# Patient Record
Sex: FEMALE | Race: WHITE | NOT HISPANIC OR LATINO | Employment: OTHER | URBAN - METROPOLITAN AREA
[De-identification: names, ages, dates, MRNs, and addresses within clinical notes are randomized per-mention and may not be internally consistent; named-entity substitution may affect disease eponyms.]

---

## 2017-04-12 ENCOUNTER — ALLSCRIPTS OFFICE VISIT (OUTPATIENT)
Dept: OTHER | Facility: OTHER | Age: 82
End: 2017-04-12

## 2017-05-02 ENCOUNTER — GENERIC CONVERSION - ENCOUNTER (OUTPATIENT)
Dept: OTHER | Facility: OTHER | Age: 82
End: 2017-05-02

## 2017-09-12 ENCOUNTER — GENERIC CONVERSION - ENCOUNTER (OUTPATIENT)
Dept: OTHER | Facility: OTHER | Age: 82
End: 2017-09-12

## 2017-10-23 ENCOUNTER — APPOINTMENT (OUTPATIENT)
Dept: LAB | Facility: CLINIC | Age: 82
End: 2017-10-23
Payer: MEDICARE

## 2017-10-23 ENCOUNTER — GENERIC CONVERSION - ENCOUNTER (OUTPATIENT)
Dept: FAMILY MEDICINE CLINIC | Facility: CLINIC | Age: 82
End: 2017-10-23

## 2017-10-23 ENCOUNTER — GENERIC CONVERSION - ENCOUNTER (OUTPATIENT)
Dept: OTHER | Facility: OTHER | Age: 82
End: 2017-10-23

## 2017-10-23 DIAGNOSIS — M81.0 AGE-RELATED OSTEOPOROSIS WITHOUT CURRENT PATHOLOGICAL FRACTURE: ICD-10-CM

## 2017-10-23 DIAGNOSIS — I10 ESSENTIAL (PRIMARY) HYPERTENSION: ICD-10-CM

## 2017-10-23 LAB
ALBUMIN SERPL BCP-MCNC: 4 G/DL (ref 3.5–5)
ALP SERPL-CCNC: 67 U/L (ref 46–116)
ALT SERPL W P-5'-P-CCNC: 18 U/L (ref 12–78)
ANION GAP SERPL CALCULATED.3IONS-SCNC: 6 MMOL/L (ref 4–13)
AST SERPL W P-5'-P-CCNC: 19 U/L (ref 5–45)
BASOPHILS # BLD AUTO: 0.02 THOUSANDS/ΜL (ref 0–0.1)
BASOPHILS NFR BLD AUTO: 0 % (ref 0–1)
BILIRUB SERPL-MCNC: 0.57 MG/DL (ref 0.2–1)
BUN SERPL-MCNC: 27 MG/DL (ref 5–25)
CALCIUM SERPL-MCNC: 9.8 MG/DL (ref 8.3–10.1)
CHLORIDE SERPL-SCNC: 104 MMOL/L (ref 100–108)
CO2 SERPL-SCNC: 31 MMOL/L (ref 21–32)
CREAT SERPL-MCNC: 1.25 MG/DL (ref 0.6–1.3)
EOSINOPHIL # BLD AUTO: 0.06 THOUSAND/ΜL (ref 0–0.61)
EOSINOPHIL NFR BLD AUTO: 1 % (ref 0–6)
ERYTHROCYTE [DISTWIDTH] IN BLOOD BY AUTOMATED COUNT: 13.7 % (ref 11.6–15.1)
GFR SERPL CREATININE-BSD FRML MDRD: 38 ML/MIN/1.73SQ M
GLUCOSE SERPL-MCNC: 106 MG/DL (ref 65–140)
HCT VFR BLD AUTO: 40.4 % (ref 34.8–46.1)
HGB BLD-MCNC: 13.2 G/DL (ref 11.5–15.4)
LYMPHOCYTES # BLD AUTO: 1.63 THOUSANDS/ΜL (ref 0.6–4.47)
LYMPHOCYTES NFR BLD AUTO: 17 % (ref 14–44)
MCH RBC QN AUTO: 31.6 PG (ref 26.8–34.3)
MCHC RBC AUTO-ENTMCNC: 32.7 G/DL (ref 31.4–37.4)
MCV RBC AUTO: 97 FL (ref 82–98)
MONOCYTES # BLD AUTO: 0.59 THOUSAND/ΜL (ref 0.17–1.22)
MONOCYTES NFR BLD AUTO: 6 % (ref 4–12)
NEUTROPHILS # BLD AUTO: 7.03 THOUSANDS/ΜL (ref 1.85–7.62)
NEUTS SEG NFR BLD AUTO: 76 % (ref 43–75)
NRBC BLD AUTO-RTO: 0 /100 WBCS
PLATELET # BLD AUTO: 232 THOUSANDS/UL (ref 149–390)
PMV BLD AUTO: 10.2 FL (ref 8.9–12.7)
POTASSIUM SERPL-SCNC: 4.4 MMOL/L (ref 3.5–5.3)
PROT SERPL-MCNC: 8.4 G/DL (ref 6.4–8.2)
RBC # BLD AUTO: 4.18 MILLION/UL (ref 3.81–5.12)
SODIUM SERPL-SCNC: 141 MMOL/L (ref 136–145)
TSH SERPL DL<=0.05 MIU/L-ACNC: 3.08 UIU/ML (ref 0.36–3.74)
WBC # BLD AUTO: 9.36 THOUSAND/UL (ref 4.31–10.16)

## 2017-10-23 PROCEDURE — 85025 COMPLETE CBC W/AUTO DIFF WBC: CPT

## 2017-10-23 PROCEDURE — 36415 COLL VENOUS BLD VENIPUNCTURE: CPT

## 2017-10-23 PROCEDURE — 80053 COMPREHEN METABOLIC PANEL: CPT

## 2017-10-23 PROCEDURE — 84443 ASSAY THYROID STIM HORMONE: CPT

## 2018-01-10 NOTE — RESULT NOTES
Verified Results  * XR CHEST PA & LATERAL [id number] 13:64XG Clrai Sit     Test Name Result Flag Reference   XR CHEST PA & LATERAL (Report)     CHEST      INDICATION: History of pneumonia  Weakness  , July 31, 2015   COMPARISON: October 6, 2015     VIEWS: Frontal and lateral projections; 2 images     FINDINGS:        The heart mediastinum are stable and within normal limits  Lungs are hyperinflated  There are chronic interstitial changes again seen within the lung bases  Superimposed on these chronic changes is a focal region of parenchymal opacity in the left lung base medially  This may have progressed when compared to    prior study and therefore CT scanning is recommended for further evaluation  Additional regions of parenchymal scarring are noted within the right midlung zone and within the lung apices bilaterally  These are unchanged  Compression fracture noted within the thoracic spine at the T8 level stable  IMPRESSION:     Focal parenchymal opacity within the left lung base for which CT scanning is recommended for further evaluation  This may have progressed when compared to prior study of October 6, 2015       ##sigslh##sigslh     ##[id number]##fuslh01       Workstation performed: [id number]     Signed by:   Kay Cagle MD   5/27/16       Discussion/Summary   please call  recent chest xray was reviewed by radiologist who suggested having a CAT scan for further evaluation  I will print out and she can schedule

## 2018-01-10 NOTE — MISCELLANEOUS
Chief Complaint  Chief Complaint Free Text Note Form: JESSIE: PT was admitted to [facility] from 06/20/2016 through 06/28/2016  Dx: Fall, chronic pain, bronchiectasis without complication, essential HTN, near syncope, CKD 3, hyponatremia, urinary retention, protein calorie malnutrition, mild  Spoke with Kesha Wilson (caretaker) who reports pt was d/c to [facility] for rehab    07/21/2016: Lazarus Raymond from [facility] called to schedule pt for RAMAKRISHNA ORTEGA appt  Pt is part of [employer] payment program  Pt is being d/c on Sat  7/23rd  Ofeers no complaints or concerns at this time  Pt is eager to go home  Pt is being d/c to [facility]  Follow up with pcp scheduled for 7/29th at 11:15am       History of Present Illness  TCM Communication [facility]: The patient is being contacted for follow-up after hospitalization and 06/29/2016  She was hospitalized at [facility]  The date of admission: 06/20/2016, date of discharge: 07/23/2016  Diagnosis: see note  She was discharged to an assisted living facility, From [facility] to [facility]  Medications were not reviewed today  She scheduled a follow up appointment  The patient is currently asymptomatic  Counseling was provided to patient's caretaker  Francy Carlson on 6/29/2016Seymour Butterfield from [facility] on 7/21/2016  Topics counseled included importance of compliance with treatment  Communication performed and completed by Shweta Leyva      Active Problems    1  Allergic rhinitis (477 9) (J30 9)   2  Anemia (285 9) (D64 9)   3  Back pain (724 5) (M54 9)   4  Closed fracture distal radius and ulna (813 44) (S52 609A,S52 509A)   5  Compression fracture (829 0) (T14 8)   6  Contusion, cheek (920) (S00 83XA)   7  Cystitis with hematuria (595 9) (N30 91)   8  Ecchymosis (459 89) (R58)   9  Gait disturbance (781 2) (R26 9)   10  Hypertension (401 9) (I10)   11  Laceration of forehead (873 42) (S01 81XA)   12   Left lower lobe pneumonia (486) (J18 9)   13  Nausea (787 02) (R11 0)   14  Need for pneumococcal vaccine (V03 82) (Z23)   15  Osteoporosis (733 00) (M81 0)   16  Syncope and collapse (780 2) (R55)    Past Medical History    1  History of fatigue (V13 89) (Z87 898)   2  History of hematuria (V13 09) (Z87 448)   3  History of osteopenia (V13 59) (Z87 39)    Surgical History    1  History of Hip Surgery    Social History    · Never A Smoker    Current Meds   1  Centrum Silver Adult 50+ Oral Tablet Recorded   2  Hydrochlorothiazide 12 5 MG Oral Tablet; take 1 tablet by mouth once daily; Therapy: 94HHQ7329 to (Evaluate:87Wbw2251)  Requested for: 37FYQ3888; Last   Rx:25Jan2016 Ordered   3  Lisinopril 5 MG Oral Tablet; Take 1 tablet daily; Therapy: 72QUG8386 to (Evaluate:97Tjh7668)  Requested for: 59EKU6446; Last   Rx:26Jan2016 Ordered   4  Ondansetron HCl - 4 MG Oral Tablet; TAKE 1 TABLET BY MOUTH EVERY 8 HOURS AS   NEEDED FOR NAUSUA AND VOMITING; Therapy: 21GJS6706 to (Felicia Harmon)  Requested for: 20Jun2016; Last   Rx:20Jun2016 Ordered   5  PreserVision AREDS CAPS; TAKE AS DIRECTED; Therapy: (Lauro Keenan) to Recorded   6  TraMADol HCl - 50 MG Oral Tablet; ONE PO Q 8 HRS  PRN PAIN;   Therapy: 20Jun2016 to (Last Rx:20Jun2016) Ordered   7  Vitamin D3 1000 UNIT Oral Tablet; TAKE AS DIRECTED; Therapy: (Recorded:03Bij5511) to Recorded    Allergies    1  Codeine Phosphate SOLN   2  Vicodin TABS    Health Management  Health Maintenance   Medicare Annual Wellness Visit; every 1 year; Next Due: 79IIE1472;  Active    Signatures   Electronically signed by : RON Smith ; Aug  1 9215  9:56AM EST                       (Author)

## 2018-01-13 VITALS
TEMPERATURE: 97.8 F | DIASTOLIC BLOOD PRESSURE: 76 MMHG | BODY MASS INDEX: 20.2 KG/M2 | WEIGHT: 114 LBS | RESPIRATION RATE: 14 BRPM | HEART RATE: 84 BPM | HEIGHT: 63 IN | SYSTOLIC BLOOD PRESSURE: 132 MMHG

## 2018-01-13 NOTE — RESULT NOTES
Verified Results  * CT CHEST WO CONTRAST [id number] 88:42ZO Jerry Villanueva Order Number: [id number]     Test Name Result Flag Reference   CT CHEST WO CONTRAST (Report)     CT CHEST WITHOUT IV CONTRAST     INDICATION: Pneumonia  Abnormal chest x-ray  Weight loss  COMPARISON: Multiple prior chest x-rays  TECHNIQUE: CT examination of the chest was performed without intravenous contrast  Axial, sagittal and coronal reformatted images were submitted for interpretation  Coronal thick section MIP (maximal intensity projection) images were also created  This examination, like all CT scans performed in the [facility], was performed utilizing techniques to minimize radiation dose exposure, including the use of iterative reconstruction and automated exposure control  FINDINGS:     LUNGS: There is moderate to severe cylindrical bronchiectasis, to the greatest degree in both lower lobes but also involving the remaining lobe centrally  In the lower lobes the bronchiectasis extends from central to peripheral lungs within the    dependent aspects of both lung bases as well as involving the base of the right middle lobe  There is moderate bronchial wall thickening in the most dilated segments in both lower lobes, left greater than right  Multiple dilated bronchi in the lung    bases are diffusely opacified, suggesting filled with mucoid debris  At both the base of the right middle lobe medial segment and the left lower lobe medial basilar there are areas of dense airspace consolidation, with a component of volume loss in the    right middle lobe but to a lesser degree in the left lower lobe  The left lower lobe segmental consolidation accounts for the recent chest x-ray finding  Although neoplastic etiology cannot be excluded, given the above overall findings seem more likely   this is acute on chronic infectious etiology, and/or component of postobstructive atelectasis   This doesn't appear to be present on chest x-ray from July 2015, not grossly changed since then  PLEURA: Unremarkable  HEART/GREAT VESSELS: Unremarkable for patient's age  MEDIASTINUM AND ADRIANA: Unremarkable  CHEST WALL AND LOWER NECK: Unremarkable  VISUALIZED STRUCTURES IN THE UPPER ABDOMEN: Unremarkable  OSSEOUS STRUCTURES: No acute fracture  No destructive osseous lesion  IMPRESSION:     Moderate to severe diffuse cylindrical bronchiectasis, greatest in the lower lung zones, seen in association with peribronchial thickening, left greater than right  Numerous subsegmental dilated bronchi are diffusely opacified with debris  Focal areas    of airspace consolidation in the left lower lobe and right middle lobe as detailed above  See comment  Workstation performed: [id number]     Signed by:   Gaurav De Souza MD   6/2/16       Discussion/Summary   please call caretaker  recent CT of chest showed chronic bronchial infection  She should have ov with [facility] for further eval and treat

## 2018-01-22 VITALS
WEIGHT: 115 LBS | RESPIRATION RATE: 16 BRPM | HEIGHT: 63 IN | SYSTOLIC BLOOD PRESSURE: 142 MMHG | BODY MASS INDEX: 20.38 KG/M2 | HEART RATE: 68 BPM | TEMPERATURE: 98 F | DIASTOLIC BLOOD PRESSURE: 80 MMHG

## 2018-01-22 VITALS — DIASTOLIC BLOOD PRESSURE: 80 MMHG | SYSTOLIC BLOOD PRESSURE: 140 MMHG

## 2018-04-02 DIAGNOSIS — I10 ESSENTIAL HYPERTENSION: Primary | ICD-10-CM

## 2018-04-02 RX ORDER — AMLODIPINE BESYLATE 2.5 MG/1
TABLET ORAL
Qty: 30 TABLET | Refills: 5 | Status: SHIPPED | OUTPATIENT
Start: 2018-04-02 | End: 2018-09-25 | Stop reason: SDUPTHER

## 2018-04-02 RX ORDER — METOPROLOL SUCCINATE 25 MG/1
TABLET, EXTENDED RELEASE ORAL
Qty: 30 TABLET | Refills: 5 | Status: SHIPPED | OUTPATIENT
Start: 2018-04-02 | End: 2018-09-25 | Stop reason: SDUPTHER

## 2018-04-23 ENCOUNTER — OFFICE VISIT (OUTPATIENT)
Dept: FAMILY MEDICINE CLINIC | Facility: CLINIC | Age: 83
End: 2018-04-23
Payer: MEDICARE

## 2018-04-23 VITALS
RESPIRATION RATE: 16 BRPM | BODY MASS INDEX: 21.53 KG/M2 | WEIGHT: 117 LBS | SYSTOLIC BLOOD PRESSURE: 122 MMHG | DIASTOLIC BLOOD PRESSURE: 70 MMHG | TEMPERATURE: 96.8 F | HEART RATE: 68 BPM | HEIGHT: 62 IN

## 2018-04-23 DIAGNOSIS — I10 ESSENTIAL HYPERTENSION: Primary | ICD-10-CM

## 2018-04-23 DIAGNOSIS — J30.89 NON-SEASONAL ALLERGIC RHINITIS, UNSPECIFIED TRIGGER: ICD-10-CM

## 2018-04-23 DIAGNOSIS — J30.9 ALLERGIC RHINITIS, UNSPECIFIED SEASONALITY, UNSPECIFIED TRIGGER: ICD-10-CM

## 2018-04-23 PROCEDURE — G0439 PPPS, SUBSEQ VISIT: HCPCS | Performed by: FAMILY MEDICINE

## 2018-04-23 PROCEDURE — 99213 OFFICE O/P EST LOW 20 MIN: CPT | Performed by: FAMILY MEDICINE

## 2018-04-23 RX ORDER — MONTELUKAST SODIUM 10 MG/1
10 TABLET ORAL
Qty: 30 TABLET | Refills: 5 | Status: SHIPPED | OUTPATIENT
Start: 2018-04-23 | End: 2018-10-22 | Stop reason: ALTCHOICE

## 2018-04-23 NOTE — PROGRESS NOTES
FAMILY PRACTICE OFFICE VISIT       NAME: Jean Marie Bryant  AGE: 80 y o  SEX: female       : 1925        MRN: [id number]    DATE: 2018  TIME: 12:26 PM    Assessment and Plan     Problem List Items Addressed This Visit     Essential hypertension - Primary (Chronic)     Hypertension  Patient blood pressure is stable at this time she will continue current regimen of medications  She will obtain blood work as ordered  Relevant Orders    CBC    Comprehensive metabolic panel    TSH, 3rd generation    Non-seasonal allergic rhinitis     Allergic rhinitis  Patient has tried over-the-counter Mucinex and Claritin without relief in symptoms  She was given a prescription to try singular 10 mg once daily and observe over the 1st month  Patient will call if symptoms persist           Other Visit Diagnoses     Allergic rhinitis, unspecified seasonality, unspecified trigger        Relevant Medications    montelukast (SINGULAIR) 10 mg tablet            There are no Patient Instructions on file for this visit  Chief Complaint     Chief Complaint   Patient presents with    Follow-up    Medicare Wellness Visit     subsequent       History of Present Illness     Patient is still living on her own has family members who watch over her and bring meals  She denies any recent illness nor did she complain of any pain anywhere  Patient complains of fatigue  She does not use assistive device for ambulating despite all encouragement as by myself or family members  Patient feels she does not needed despite having an obvious kyphotic gait  The patient's niece states that her is she has frequent rhinorrhea and her voice gets very raspy or goes out whenever she speaks often for an extended period of time  Patient denies any thirst sore throat        Review of Systems   Review of Systems   Constitutional: Positive for fatigue  Negative for fever  HENT:        As per HPI   Respiratory: Negative  Cardiovascular: Negative  Gastrointestinal: Negative  Genitourinary: Negative  Musculoskeletal: Negative  Active Problem List     Patient Active Problem List   Diagnosis    Chronic pain    Bronchiectasis without complication ([facility] [address] )    Essential hypertension    Fall    Near syncope    Chronic kidney disease, stage 3    Hyponatremia    Urinary retention    Protein-calorie malnutrition, mild (HCC)    Non-seasonal allergic rhinitis       Past Medical History:  Past Medical History:   Diagnosis Date    Compression fracture     Aug 2015    COPD (chronic obstructive pulmonary disease) (HCC)     Hematuria     Hypertension     Osteopenia     Renal disorder        Past Surgical History:  Past Surgical History:   Procedure Laterality Date    HIP SURGERY Left     fx       Family History:  No family history on file  Social History:  Social History     Social History    Marital status: Single     Spouse name: N/A    Number of children: N/A    Years of education: N/A     Occupational History    Not on file  Social History Main Topics    Smoking status: Never Smoker    Smokeless tobacco: Never Used    Alcohol use No    Drug use: No    Sexual activity: Not on file     Other Topics Concern    Not on file     Social History Narrative    No narrative on file       Objective     Vitals:    04/23/18 1133   BP: 122/70   Pulse: 68   Resp: 16   Temp: (!) 96 8 °F (36 °C)     Wt Readings from Last 3 Encounters:   04/23/18 53 1 kg (117 lb)   10/23/17 52 2 kg (115 lb)   04/12/17 51 7 kg (114 lb)       Physical Exam   Constitutional: She is oriented to person, place, and time  No distress  HENT:   Mouth/Throat: Oropharynx is clear and moist  No oropharyngeal exudate     Tympanic membranes within normal limits bilaterally   Neck:   No carotid bruits   Cardiovascular:   Regular rate and rhythm with no murmurs   Pulmonary/Chest:   Lungs are clear to auscultation without wheezes,rales, or rhonchi Musculoskeletal: She exhibits no edema  Patient ambulates without assistance but has a kyphotic gait with mild instability   Lymphadenopathy:     She has no cervical adenopathy  Neurological: She is alert and oriented to person, place, and time  No cranial nerve deficit         Pertinent Laboratory/Diagnostic Studies:  Lab Results   Component Value Date    GLUCOSE 106 10/23/2017    BUN 27 (H) 10/23/2017    CREATININE 1 25 10/23/2017    CALCIUM 9 8 10/23/2017     10/23/2017    K 4 4 10/23/2017    CO2 31 10/23/2017     10/23/2017     Lab Results   Component Value Date    ALT 18 10/23/2017    AST 19 10/23/2017    ALKPHOS 67 10/23/2017    BILITOT 0 57 10/23/2017       Lab Results   Component Value Date    WBC 9 36 10/23/2017    HGB 13 2 10/23/2017    HCT 40 4 10/23/2017    MCV 97 10/23/2017     10/23/2017       No results found for: TSH    No results found for: CHOL  No results found for: TRIG  No results found for: HDL  No results found for: LDLCALC  No results found for: HGBA1C    Results for orders placed or performed in visit on 10/23/17   CBC and differential   Result Value Ref Range    WBC 9 36 4 31 - 10 16 Thousand/uL    RBC 4 18 3 81 - 5 12 Million/uL    Hemoglobin 13 2 11 5 - 15 4 g/dL    Hematocrit 40 4 34 8 - 46 1 %    MCV 97 82 - 98 fL    MCH 31 6 26 8 - 34 3 pg    MCHC 32 7 31 4 - 37 4 g/dL    RDW 13 7 11 6 - 15 1 %    MPV 10 2 8 9 - 12 7 fL    Platelets 864 618 - 834 Thousands/uL    nRBC 0 /100 WBCs    Neutrophils Relative 76 (H) 43 - 75 %    Lymphocytes Relative 17 14 - 44 %    Monocytes Relative 6 4 - 12 %    Eosinophils Relative 1 0 - 6 %    Basophils Relative 0 0 - 1 %    Neutrophils Absolute 7 03 1 85 - 7 62 Thousands/µL    Lymphocytes Absolute 1 63 0 60 - 4 47 Thousands/µL    Monocytes Absolute 0 59 0 17 - 1 22 Thousand/µL    Eosinophils Absolute 0 06 0 00 - 0 61 Thousand/µL    Basophils Absolute 0 02 0 00 - 0 10 Thousands/µL   Comprehensive metabolic panel   Result Value Ref Range    Sodium 141 136 - 145 mmol/L    Potassium 4 4 3 5 - 5 3 mmol/L    Chloride 104 100 - 108 mmol/L    CO2 31 21 - 32 mmol/L    Anion Gap 6 4 - 13 mmol/L    BUN 27 (H) 5 - 25 mg/dL    Creatinine 1 25 0 60 - 1 30 mg/dL    Glucose 106 65 - 140 mg/dL    Calcium 9 8 8 3 - 10 1 mg/dL    AST 19 5 - 45 U/L    ALT 18 12 - 78 U/L    Alkaline Phosphatase 67 46 - 116 U/L    Total Protein 8 4 (H) 6 4 - 8 2 g/dL    Albumin 4 0 3 5 - 5 0 g/dL    Total Bilirubin 0 57 0 20 - 1 00 mg/dL    eGFR 38 ml/min/1 73sq m   TSH, 3rd generation   Result Value Ref Range    TSH 3RD GENERATON 3 080 0 358 - 3 740 uIU/mL       Orders Placed This Encounter   Procedures    CBC    Comprehensive metabolic panel    TSH, 3rd generation       ALLERGIES:  Allergies   Allergen Reactions    Vicodin [Hydrocodone-Acetaminophen]        Current Medications     Current Outpatient Prescriptions   Medication Sig Dispense Refill    acetaminophen (TYLENOL) 325 mg tablet Take 2 tablets (650 mg total) by mouth every 6 (six) hours as needed for mild pain, headaches or fever  30 tablet 0    amLODIPine (NORVASC) 2 5 mg tablet TAKE 1 TABLET DAILY 30 tablet 5    Cholecalciferol (VITAMIN D3) 2000 UNITS TABS Take 1 tablet by mouth daily   guaiFENesin (ROBITUSSIN) 100 MG/5ML oral liquid Take 5-10 mL (100-200 mg total) by mouth every 4 (four) hours as needed for cough  (Patient taking differently: Take 350 mg by mouth every 4 (four) hours as needed for cough   ) 60 mL 0    metoprolol succinate (TOPROL-XL) 25 mg 24 hr tablet TAKE 1 TABLET DAILY 30 tablet 5    Multiple Vitamins-Minerals (EYE VITAMINS) CAPS Take 1 capsule by mouth daily      Multiple Vitamins-Minerals (VITEYES AREDS ADVANCED PO) Take 1 capsule by mouth daily   levalbuterol (XOPENEX) 0 63 mg/3 mL nebulizer solution Take 3 mL (0 63 mg total) by nebulization every 4 (four) hours as needed for wheezing   (Patient taking differently: Take 1 ampule by nebulization every 4 (four) hours as needed for wheezing Indications: pt refuses to use   ) 3 mL 12    lisinopril (ZESTRIL) 20 mg tablet Take 20 mg by mouth daily   montelukast (SINGULAIR) 10 mg tablet Take 1 tablet (10 mg total) by mouth daily at bedtime 30 tablet 5     No current facility-administered medications for this visit            Health Maintenance     Health Maintenance   Topic Date Due    SLP PLAN OF CARE  11/20/[birth year]25    Depression Screening PHQ-9  11/20/1937    DTaP,Tdap,and Td Vaccines (1 - Tdap) 11/20/1946    Fall Risk  11/20/1990    Urinary Incontinence Screening  11/20/1990    GLAUCOMA SCREENING 67+ YR  11/20/1992    INFLUENZA VACCINE  Completed    PNEUMOCOCCAL POLYSACCHARIDE VACCINE AGE 72 AND OVER  Completed     Immunization History   Administered Date(s) Administered    Influenza Split High Dose Preservative Free IM 10/10/2016, 10/23/2017    Influenza TIV (IM) 11/03/2003, 11/16/2005, 11/15/2006, 11/18/2007, 10/17/2008, 12/05/2009, 10/28/2010, 10/11/2011, 10/15/2012, 09/26/2013, 09/29/2014, 09/19/2015    Pneumococcal Conjugate 13-Valent 12/04/2015    Pneumococcal Polysaccharide PPV23 11/16/2005, 80/49/1774       Ashleigh Manriquez MD

## 2018-04-23 NOTE — ASSESSMENT & PLAN NOTE
Hypertension  Patient blood pressure is stable at this time she will continue current regimen of medications  She will obtain blood work as ordered

## 2018-04-23 NOTE — ASSESSMENT & PLAN NOTE
Allergic rhinitis  Patient has tried over-the-counter Mucinex and Claritin without relief in symptoms  She was given a prescription to try singular 10 mg once daily and observe over the 1st month    Patient will call if symptoms persist

## 2018-04-23 NOTE — PROGRESS NOTES
HPI:  Kenia Calix is a 80 y o  female here for her Subsequent Wellness Visit  Patient Active Problem List   Diagnosis    Chronic pain    Bronchiectasis without complication ([address] )    Essential hypertension    Fall    Near syncope    Chronic kidney disease, stage 3    Hyponatremia    Urinary retention    Protein-calorie malnutrition, mild (HCC)     Past Medical History:   Diagnosis Date    Compression fracture     Aug 2015    COPD (chronic obstructive pulmonary disease) (HCC)     Hematuria     Hypertension     Osteopenia     Renal disorder      Past Surgical History:   Procedure Laterality Date    HIP SURGERY Left     fx     No family history on file  History   Smoking Status    Never Smoker   Smokeless Tobacco    Never Used     History   Alcohol Use No      History   Drug Use No     /70 (BP Location: Right arm, Patient Position: Sitting, Cuff Size: Standard)   Pulse 68   Temp (!) 96 8 °F (36 °C) (Tympanic)   Resp 16   Ht 5' 1 5" (1 562 m)   Wt 53 1 kg (117 lb)   BMI 21 75 kg/m²       Current Outpatient Prescriptions   Medication Sig Dispense Refill    acetaminophen (TYLENOL) 325 mg tablet Take 2 tablets (650 mg total) by mouth every 6 (six) hours as needed for mild pain, headaches or fever  30 tablet 0    amLODIPine (NORVASC) 2 5 mg tablet TAKE 1 TABLET DAILY 30 tablet 5    Cholecalciferol (VITAMIN D3) 2000 UNITS TABS Take 1 tablet by mouth daily   guaiFENesin (ROBITUSSIN) 100 MG/5ML oral liquid Take 5-10 mL (100-200 mg total) by mouth every 4 (four) hours as needed for cough  (Patient taking differently: Take 350 mg by mouth every 4 (four) hours as needed for cough   ) 60 mL 0    metoprolol succinate (TOPROL-XL) 25 mg 24 hr tablet TAKE 1 TABLET DAILY 30 tablet 5    Multiple Vitamins-Minerals (EYE VITAMINS) CAPS Take 1 capsule by mouth daily      Multiple Vitamins-Minerals (VITEYES AREDS ADVANCED PO) Take 1 capsule by mouth daily        levalbuterol (XOPENEX) 0 63 mg/3 mL nebulizer solution Take 3 mL (0 63 mg total) by nebulization every 4 (four) hours as needed for wheezing  (Patient taking differently: Take 1 ampule by nebulization every 4 (four) hours as needed for wheezing Indications: pt refuses to use   ) 3 mL 12    lisinopril (ZESTRIL) 20 mg tablet Take 20 mg by mouth daily   montelukast (SINGULAIR) 10 mg tablet Take 1 tablet (10 mg total) by mouth daily at bedtime 30 tablet 5     No current facility-administered medications for this visit        Allergies   Allergen Reactions    Vicodin [Hydrocodone-Acetaminophen]      Immunization History   Administered Date(s) Administered    Influenza Split High Dose Preservative Free IM 10/10/2016, 10/23/2017    Influenza TIV (IM) 11/03/2003, 11/16/2005, 11/15/2006, 11/18/2007, 10/17/2008, 12/05/2009, 10/28/2010, 10/11/2011, 10/15/2012, 09/26/2013, 09/29/2014, 09/19/2015    Pneumococcal Conjugate 13-Valent 12/04/2015    Pneumococcal Polysaccharide PPV23 11/16/2005, 10/28/2010       Patient Care Team:  Lina Bashir MD as PCP - Olga Khan MD Quince Havers, MD Horald Lofts, MD    Medicare Screening Tests and Risk Assessments:  AWV Clinical     ISAR:   Previous hospitalizations?:  No       Once in a Lifetime Medicare Screening:   EKG performed?:  No    AAA screening performed? (if performed, please add date to Health Maintenance):  No       Medicare Screening Tests and Risk Assessment:   AAA Risk Assessment    None Indicated:  Yes    Osteoporosis Risk Assessment     Female:  Yes   :  Yes :  No   Age over 48:  Yes Low body weight (<127lbs):  Yes   Tobacco use:  No Alcohol use:  Yes   Low calcium diet:  No PMHX of fractures:  Yes   FHX of fractures:  Yes    HIV Risk Assessment    None indicated:  Yes        Drug and Alcohol Use:   Tobacco use    Cigarettes:  never smoker    Tobacco use duration    Tobacco Cessation Readiness    Alcohol use    Alcohol use:  frequent use    Amount of alcohol consumed:  1 drink a day   Concern about alcohol use:  No    Alcohol Treatment Readiness   Illicit Drug Use    Drug use:  never    Drug type:  no sedative use       Diet & Exercise:   Diet   What is your diet?:  Regular   How many servings a day of the following:   Fruits and Vegetables:  1-2 Meat:  1-2   Whole Grains:  1 Simple Carbs:  0   Dairy:  0 Soda:  0   Coffee:  1 Tea:  0   Exercise    Do you currently exercise?:  unable to exercise       Cognitive Impairment Screening:   Depression screening preformed:  Yes     PHQ-9 Depression scale score:  3   Depression screening results:  no significant symptoms   Cognitive Impairment Screening    Do you have difficulty learning or retaining new information?:  No Do you have difficulty handling new tasks?:  No   Do you have difficulty with reasoning?:  No Do you have difficulty with spatial ability and orientation?:  No   Do you have difficulty with language?:  No Do you have difficulty with behavior?:  No       Functional Ability/Level of Safety:   Hearing     Bilateral:  significantly decreased   Hearing aid:  No    Hearing Impairment Assessment    Hearing status:  Hard of hearing   Current Activities    Status:  limited ADL's, no driving, limited IADL's, limited social activities   Help needed with the folllowing:    Using the phone:  No Transportation:  Yes   Shopping:  Yes Preparing Meals:  No   Doing Housework:  Yes Doing Laundry:  Yes   Managing Medications:  No Managing Money:  No   ADL    Feeding:  Independant   Oral hygiene and Facial grooming:  Independant   Bathing:  Independant   Upper Body Dressing:  Independant   Lower Body Dressing:  Independant   Toileting:  Independant   Bed Mobility:  Independant   Fall Risk   Have you fallen in the last 12 months?:  No Are you unsteady on your feet?:  Yes    Are you taking any medications that may cause fatigue or dizziness?:  Yes    Do you rush to the bathroom potentially risking a fall?:  No   Injury History   Polypharmacy:  No Antidepressant Use:  No   Sedative Use:  No Antihypertensive Use:  Yes   Previous Fall:  Yes Alcohol Use:  Yes   Deconditioning:  Yes Visual Impairment:  Yes   Cogitive Impairment:  No Mmobility Impairment:  Yes   Postural Hypotension:  No Urinary Incontinence:  No       Home Safety:   Are there hazards in your environment?:  No   Home Safety Risk Factors   Unfamilar with surroundings:  No Uneven floors:  No   Stairs or handrail saftey risk:  No Loose rugs:  No   Household clutter:  No Poor household lighting:  No   No grab bars in bathroom:  No Further evaluation needed:  No       Advanced Directives:   Advanced Directives    Living Will:  Yes Durable POA for healthcare: Yes   Advanced directive:  Yes    Patient's End of Life Decisions        Urinary Incontinence:   Do you have urinary incontinence?:  No        Glaucoma:            Provider Screening    No data filed        No exam data present    Physical Exam : MMSE=29 pattern  Physical Exam    Reviewed Updated [facility] Prior Wellness Visits:   Last Medicare wellness visit information was reviewed, patient interviewed , no change since last AWVyes  Last Medicare wellness visit information was reviewed, patient interviewed and updates made to the record today yes    Assessment and Plan:  1  Essential hypertension  CBC    Comprehensive metabolic panel    TSH, 3rd generation   2   Allergic rhinitis, unspecified seasonality, unspecified trigger  montelukast (SINGULAIR) 10 mg tablet       Health Maintenance Due   Topic Date Due    SLP PLAN OF CARE  11/20/1925    Depression Screening PHQ-9  11/20/1937    DTaP,Tdap,and Td Vaccines (1 - Tdap) 11/20/1946    Fall Risk  11/20/1990    Urinary Incontinence Screening  11/20/1990    GLAUCOMA SCREENING 67+ YR  11/20/1992

## 2018-06-12 ENCOUNTER — APPOINTMENT (OUTPATIENT)
Dept: LAB | Facility: CLINIC | Age: 83
End: 2018-06-12
Payer: MEDICARE

## 2018-06-12 ENCOUNTER — TELEPHONE (OUTPATIENT)
Dept: FAMILY MEDICINE CLINIC | Facility: CLINIC | Age: 83
End: 2018-06-12

## 2018-06-12 DIAGNOSIS — I10 ESSENTIAL HYPERTENSION: ICD-10-CM

## 2018-06-12 LAB
ALBUMIN SERPL BCP-MCNC: 3.8 G/DL (ref 3.5–5)
ALP SERPL-CCNC: 59 U/L (ref 46–116)
ALT SERPL W P-5'-P-CCNC: 19 U/L (ref 12–78)
ANION GAP SERPL CALCULATED.3IONS-SCNC: 7 MMOL/L (ref 4–13)
AST SERPL W P-5'-P-CCNC: 19 U/L (ref 5–45)
BILIRUB SERPL-MCNC: 0.64 MG/DL (ref 0.2–1)
BUN SERPL-MCNC: 23 MG/DL (ref 5–25)
CALCIUM SERPL-MCNC: 9 MG/DL (ref 8.3–10.1)
CHLORIDE SERPL-SCNC: 104 MMOL/L (ref 100–108)
CO2 SERPL-SCNC: 28 MMOL/L (ref 21–32)
CREAT SERPL-MCNC: 1.2 MG/DL (ref 0.6–1.3)
ERYTHROCYTE [DISTWIDTH] IN BLOOD BY AUTOMATED COUNT: 13.3 % (ref 11.6–15.1)
GFR SERPL CREATININE-BSD FRML MDRD: 39 ML/MIN/1.73SQ M
GLUCOSE SERPL-MCNC: 106 MG/DL (ref 65–140)
HCT VFR BLD AUTO: 41 % (ref 34.8–46.1)
HGB BLD-MCNC: 12.8 G/DL (ref 11.5–15.4)
MCH RBC QN AUTO: 31.4 PG (ref 26.8–34.3)
MCHC RBC AUTO-ENTMCNC: 31.2 G/DL (ref 31.4–37.4)
MCV RBC AUTO: 101 FL (ref 82–98)
PLATELET # BLD AUTO: 214 THOUSANDS/UL (ref 149–390)
PMV BLD AUTO: 10.7 FL (ref 8.9–12.7)
POTASSIUM SERPL-SCNC: 4.1 MMOL/L (ref 3.5–5.3)
PROT SERPL-MCNC: 7.7 G/DL (ref 6.4–8.2)
RBC # BLD AUTO: 4.08 MILLION/UL (ref 3.81–5.12)
SODIUM SERPL-SCNC: 139 MMOL/L (ref 136–145)
TSH SERPL DL<=0.05 MIU/L-ACNC: 3.59 UIU/ML (ref 0.36–3.74)
WBC # BLD AUTO: 6.47 THOUSAND/UL (ref 4.31–10.16)

## 2018-06-12 PROCEDURE — 36415 COLL VENOUS BLD VENIPUNCTURE: CPT

## 2018-06-12 PROCEDURE — 85027 COMPLETE CBC AUTOMATED: CPT

## 2018-06-12 PROCEDURE — 84443 ASSAY THYROID STIM HORMONE: CPT

## 2018-06-12 PROCEDURE — 80053 COMPREHEN METABOLIC PANEL: CPT

## 2018-06-12 NOTE — TELEPHONE ENCOUNTER
----- Message from Carli Arias MD sent at 1/27/0549  1:35 PM EDT -----  All recent blood work stable for patient

## 2018-09-25 DIAGNOSIS — I10 ESSENTIAL HYPERTENSION: ICD-10-CM

## 2018-09-25 RX ORDER — AMLODIPINE BESYLATE 2.5 MG/1
TABLET ORAL
Qty: 30 TABLET | Refills: 5 | Status: SHIPPED | OUTPATIENT
Start: 2018-09-25

## 2018-09-25 RX ORDER — METOPROLOL SUCCINATE 25 MG/1
TABLET, EXTENDED RELEASE ORAL
Qty: 30 TABLET | Refills: 5 | Status: SHIPPED | OUTPATIENT
Start: 2018-09-25

## 2018-10-10 DIAGNOSIS — I10 ESSENTIAL HYPERTENSION: ICD-10-CM

## 2018-10-10 RX ORDER — AMLODIPINE BESYLATE 2.5 MG/1
TABLET ORAL
Qty: 30 TABLET | Refills: 5 | Status: SHIPPED | OUTPATIENT
Start: 2018-10-10 | End: 2018-10-22 | Stop reason: ALTCHOICE

## 2018-10-17 DIAGNOSIS — I10 ESSENTIAL HYPERTENSION: ICD-10-CM

## 2018-10-17 RX ORDER — AMLODIPINE BESYLATE 2.5 MG/1
TABLET ORAL
Qty: 30 TABLET | Refills: 5 | Status: SHIPPED | OUTPATIENT
Start: 2018-10-17 | End: 2018-10-22 | Stop reason: ALTCHOICE

## 2018-10-22 ENCOUNTER — OFFICE VISIT (OUTPATIENT)
Dept: FAMILY MEDICINE CLINIC | Facility: CLINIC | Age: 83
End: 2018-10-22
Payer: MEDICARE

## 2018-10-22 VITALS
RESPIRATION RATE: 16 BRPM | SYSTOLIC BLOOD PRESSURE: 110 MMHG | HEIGHT: 61 IN | HEART RATE: 64 BPM | TEMPERATURE: 98.7 F | BODY MASS INDEX: 21.79 KG/M2 | WEIGHT: 115.4 LBS | DIASTOLIC BLOOD PRESSURE: 78 MMHG

## 2018-10-22 DIAGNOSIS — I10 ESSENTIAL HYPERTENSION: Chronic | ICD-10-CM

## 2018-10-22 DIAGNOSIS — Z23 NEED FOR INFLUENZA VACCINATION: Primary | ICD-10-CM

## 2018-10-22 PROCEDURE — G0008 ADMIN INFLUENZA VIRUS VAC: HCPCS | Performed by: FAMILY MEDICINE

## 2018-10-22 PROCEDURE — 99213 OFFICE O/P EST LOW 20 MIN: CPT | Performed by: FAMILY MEDICINE

## 2018-10-22 PROCEDURE — 90662 IIV NO PRSV INCREASED AG IM: CPT | Performed by: FAMILY MEDICINE

## 2018-10-22 RX ORDER — BIOTIN 1 MG
TABLET ORAL
COMMUNITY
End: 2018-10-22 | Stop reason: ALTCHOICE

## 2018-10-22 NOTE — ASSESSMENT & PLAN NOTE
Hypertension  Blood pressure is stable on current regimen of medications  Her most recent blood test on last office visit we were stable for patient    Patient did receive her annual flu vaccine today

## 2018-10-22 NOTE — PROGRESS NOTES
FAMILY PRACTICE OFFICE VISIT       NAME: Geraldo Bryant  AGE: 80 y o  SEX: female       : 1925        MRN: [id number]    DATE: 10/22/2018  TIME: 1:37 PM    Assessment and Plan     Problem List Items Addressed This Visit     Essential hypertension (Chronic)     Hypertension  Blood pressure is stable on current regimen of medications  Her most recent blood test on last office visit we were stable for patient  Patient did receive her annual flu vaccine today           Other Visit Diagnoses     Need for influenza vaccination    -  Primary    Relevant Orders    influenza vaccine, [id number], high-dose, PF 0 5 mL, for patients 65 yr+ (FLUZONE HIGH-DOSE) (Completed)            There are no Patient Instructions on file for this visit  Chief Complaint     Chief Complaint   Patient presents with    Follow-up     Patient is here for a follow up visit  History of Present Illness     Patient denies any recent illness  She still living on her own with support from her family members and neighbors  She had 1 incident of fall at home approximately 2 months ago  She denies any injuries but was unable to get up from the floor  Patient notified her niece who came to help her  Patient is adamant about not leaving her home or having to move to an assisted living facility  Review of Systems   Review of Systems   Constitutional: Positive for fatigue  HENT: Negative  Eyes: Negative  Respiratory: Negative  Cardiovascular: Negative  Gastrointestinal: Negative  Genitourinary: Negative  Musculoskeletal:        Patient with chronic intermittent lower extremity weakness with gait disturbance  Patient occasionally uses her walker to ambulate   Skin: Negative  Neurological: Negative  Psychiatric/Behavioral: Negative          Active Problem List     Patient Active Problem List   Diagnosis    Chronic pain    Bronchiectasis without complication ([address] )    Essential hypertension    Fall    Near syncope    Chronic kidney disease, stage 3 (HCC)    Hyponatremia    Urinary retention    Protein-calorie malnutrition, mild (HCC)    Non-seasonal allergic rhinitis       Past Medical History:  Past Medical History:   Diagnosis Date    Compression fracture     Aug 2015    COPD (chronic obstructive pulmonary disease) (HCC)     Hematuria     Hypertension     Osteopenia     Renal disorder        Past Surgical History:  Past Surgical History:   Procedure Laterality Date    HIP SURGERY Left     fx       Family History:  No family history on file  Social History:  Social History     Social History    Marital status: Single     Spouse name: N/A    Number of children: N/A    Years of education: N/A     Occupational History    Not on file  Social History Main Topics    Smoking status: Never Smoker    Smokeless tobacco: Never Used    Alcohol use No    Drug use: No    Sexual activity: Not on file     Other Topics Concern    Not on file     Social History Narrative    No narrative on file       Objective     Vitals:    10/22/18 1304   BP: 110/78   Pulse: 64   Resp: 16   Temp: 98 7 °F (37 1 °C)     Wt Readings from Last 3 Encounters:   10/22/18 52 3 kg (115 lb 6 4 oz)   04/23/18 53 1 kg (117 lb)   10/23/17 52 2 kg (115 lb)       Physical Exam   Constitutional: She is oriented to person, place, and time  No distress  HENT:   Mouth/Throat: Oropharynx is clear and moist  No oropharyngeal exudate  Tympanic membranes within normal limits bilaterally   Neck:   No carotid bruit   Cardiovascular:   Regular rate and rhythm with no murmurs   Pulmonary/Chest:   Lungs are clear to auscultation without wheezes,rales, or rhonchi   Musculoskeletal: She exhibits no edema  Patient ambulates slowly without assistive device  She has a slight kyphotic gait   Lymphadenopathy:     She has no cervical adenopathy  Neurological: She is alert and oriented to person, place, and time   No cranial nerve deficit  Psychiatric: She has a normal mood and affect   Her behavior is normal  Judgment and thought content normal        Pertinent Laboratory/Diagnostic Studies:  Lab Results   Component Value Date    GLUCOSE 80 08/07/2015    BUN 23 06/12/2018    CREATININE 1 20 06/12/2018    CALCIUM 9 0 06/12/2018     06/12/2018    K 4 1 06/12/2018    CO2 28 06/12/2018     06/12/2018     Lab Results   Component Value Date    ALT 19 06/12/2018    AST 19 06/12/2018    ALKPHOS 59 06/12/2018    BILITOT 0 48 08/07/2015       Lab Results   Component Value Date    WBC 6 47 06/12/2018    HGB 12 8 06/12/2018    HCT 41 0 06/12/2018     (H) 06/12/2018     06/12/2018       No results found for: TSH    No results found for: CHOL  No results found for: TRIG  No results found for: HDL  No results found for: LDLCALC  No results found for: HGBA1C    Results for orders placed or performed in visit on 06/12/18   CBC   Result Value Ref Range    WBC 6 47 4 31 - 10 16 Thousand/uL    RBC 4 08 3 81 - 5 12 Million/uL    Hemoglobin 12 8 11 5 - 15 4 g/dL    Hematocrit 41 0 34 8 - 46 1 %     (H) 82 - 98 fL    MCH 31 4 26 8 - 34 3 pg    MCHC 31 2 (L) 31 4 - 37 4 g/dL    RDW 13 3 11 6 - 15 1 %    Platelets 198 769 - 461 Thousands/uL    MPV 10 7 8 9 - 12 7 fL   Comprehensive metabolic panel   Result Value Ref Range    Sodium 139 136 - 145 mmol/L    Potassium 4 1 3 5 - 5 3 mmol/L    Chloride 104 100 - 108 mmol/L    CO2 28 21 - 32 mmol/L    ANION GAP 7 4 - 13 mmol/L    BUN 23 5 - 25 mg/dL    Creatinine 1 20 0 60 - 1 30 mg/dL    Glucose 106 65 - 140 mg/dL    Calcium 9 0 8 3 - 10 1 mg/dL    AST 19 5 - 45 U/L    ALT 19 12 - 78 U/L    Alkaline Phosphatase 59 46 - 116 U/L    Total Protein 7 7 6 4 - 8 2 g/dL    Albumin 3 8 3 5 - 5 0 g/dL    Total Bilirubin 0 64 0 20 - 1 00 mg/dL    eGFR 39 ml/min/1 73sq m   TSH, 3rd generation   Result Value Ref Range    TSH 3RD GENERATON 3 590 0 358 - 3 740 uIU/mL       Orders Placed This Encounter Procedures    influenza vaccine, [id number], high-dose, PF 0 5 mL, for patients 65 yr+ (FLUZONE HIGH-DOSE)       ALLERGIES:  Allergies   Allergen Reactions    Codeine GI Intolerance     Reaction Date: [id number];     Vicodin [Hydrocodone-Acetaminophen]        Current Medications     Current Outpatient Prescriptions   Medication Sig Dispense Refill    acetaminophen (TYLENOL) 325 mg tablet Take 2 tablets (650 mg total) by mouth every 6 (six) hours as needed for mild pain, headaches or fever  30 tablet 0    amLODIPine (NORVASC) 2 5 mg tablet TAKE 1 TABLET DAILY 30 tablet 5    Cholecalciferol (VITAMIN D3) 2000 UNITS TABS Take 1 tablet by mouth daily   metoprolol succinate (TOPROL-XL) 25 mg 24 hr tablet TAKE 1 TABLET DAILY 30 tablet 5    Multiple Vitamins-Minerals (CENTRUM SILVER ADULT 50+ PO) Take 1 tablet by mouth daily       No current facility-administered medications for this visit            Health Maintenance     Health Maintenance   Topic Date Due    SLP PLAN OF CARE  11/20/1925    DTaP,Tdap,and Td Vaccines (1 - Tdap) 11/20/1946    INFLUENZA VACCINE  07/01/2018    Fall Risk  04/23/2019    Urinary Incontinence Screening  04/23/2019    Depression Screening PHQ  10/22/2019    Pneumococcal PPSV23/PCV13 65+ Years / Low and Medium Risk  Completed     Immunization History   Administered Date(s) Administered    Influenza Split High Dose Preservative Free IM 10/10/2016, 10/23/2017    Influenza TIV (IM) 11/03/2003, 11/16/2005, 11/15/2006, 11/18/2007, 10/17/2008, 12/05/2009, 10/28/2010, 10/11/2011, 10/15/2012, 09/26/2013, 09/29/2014, 09/19/2015    Influenza, high dose seasonal 0 5 mL 10/22/2018    Pneumococcal Conjugate 13-Valent 12/04/2015    Pneumococcal Polysaccharide PPV23 11/16/2005, 97/86/6420       Mushtaq Rogers MD

## 2018-10-26 DIAGNOSIS — I10 ESSENTIAL HYPERTENSION: ICD-10-CM

## 2018-10-26 RX ORDER — AMLODIPINE BESYLATE 2.5 MG/1
TABLET ORAL
Qty: 30 TABLET | Refills: 5 | Status: SHIPPED | OUTPATIENT
Start: 2018-10-26 | End: 2018-11-12

## 2018-10-29 DIAGNOSIS — I10 ESSENTIAL HYPERTENSION: ICD-10-CM

## 2018-10-29 RX ORDER — METOPROLOL SUCCINATE 25 MG/1
TABLET, EXTENDED RELEASE ORAL
Qty: 30 TABLET | Refills: 5 | Status: SHIPPED | OUTPATIENT
Start: 2018-10-29 | End: 2018-11-12

## 2018-10-29 RX ORDER — AMLODIPINE BESYLATE 2.5 MG/1
TABLET ORAL
Qty: 30 TABLET | Refills: 5 | Status: SHIPPED | OUTPATIENT
Start: 2018-10-29 | End: 2018-11-12

## 2018-11-12 ENCOUNTER — HOSPITAL ENCOUNTER (INPATIENT)
Facility: HOSPITAL | Age: 83
LOS: 4 days | Discharge: NURSING, REHAB OR LONG-TERM CARE | DRG: 543 | End: 2018-11-16
Attending: EMERGENCY MEDICINE | Admitting: INTERNAL MEDICINE
Payer: MEDICARE

## 2018-11-12 ENCOUNTER — APPOINTMENT (EMERGENCY)
Dept: CT IMAGING | Facility: HOSPITAL | Age: 83
DRG: 543 | End: 2018-11-12
Payer: MEDICARE

## 2018-11-12 DIAGNOSIS — M48.00 CENTRAL STENOSIS OF SPINAL CANAL: ICD-10-CM

## 2018-11-12 DIAGNOSIS — S22.000A COMPRESSION FX, THORACIC SPINE (HCC): ICD-10-CM

## 2018-11-12 DIAGNOSIS — R52 INTRACTABLE PAIN: ICD-10-CM

## 2018-11-12 DIAGNOSIS — S22.080A T12 COMPRESSION FRACTURE (HCC): Primary | ICD-10-CM

## 2018-11-12 DIAGNOSIS — R33.9 URINARY RETENTION: ICD-10-CM

## 2018-11-12 DIAGNOSIS — K59.00 CONSTIPATION: ICD-10-CM

## 2018-11-12 DIAGNOSIS — R26.81 GAIT INSTABILITY: ICD-10-CM

## 2018-11-12 PROBLEM — M54.50 LOWER BACK PAIN: Status: ACTIVE | Noted: 2018-11-12

## 2018-11-12 PROBLEM — R93.89 ABNORMAL CHEST CT: Status: ACTIVE | Noted: 2018-11-12

## 2018-11-12 LAB
ALBUMIN SERPL BCP-MCNC: 3.8 G/DL (ref 3.5–5)
ALP SERPL-CCNC: 66 U/L (ref 46–116)
ALT SERPL W P-5'-P-CCNC: 26 U/L (ref 12–78)
ANION GAP SERPL CALCULATED.3IONS-SCNC: 10 MMOL/L (ref 4–13)
AST SERPL W P-5'-P-CCNC: 24 U/L (ref 5–45)
BACTERIA UR QL AUTO: ABNORMAL /HPF
BASOPHILS # BLD AUTO: 0.03 THOUSANDS/ΜL (ref 0–0.1)
BASOPHILS NFR BLD AUTO: 0 % (ref 0–1)
BILIRUB SERPL-MCNC: 0.5 MG/DL (ref 0.2–1)
BILIRUB UR QL STRIP: NEGATIVE
BUN SERPL-MCNC: 23 MG/DL (ref 5–25)
CALCIUM SERPL-MCNC: 9.3 MG/DL (ref 8.3–10.1)
CHLORIDE SERPL-SCNC: 104 MMOL/L (ref 100–108)
CLARITY UR: CLEAR
CO2 SERPL-SCNC: 28 MMOL/L (ref 21–32)
COLOR UR: YELLOW
CREAT SERPL-MCNC: 1.13 MG/DL (ref 0.6–1.3)
EOSINOPHIL # BLD AUTO: 0.09 THOUSAND/ΜL (ref 0–0.61)
EOSINOPHIL NFR BLD AUTO: 1 % (ref 0–6)
ERYTHROCYTE [DISTWIDTH] IN BLOOD BY AUTOMATED COUNT: 12.8 % (ref 11.6–15.1)
GFR SERPL CREATININE-BSD FRML MDRD: 42 ML/MIN/1.73SQ M
GLUCOSE SERPL-MCNC: 103 MG/DL (ref 65–140)
GLUCOSE UR STRIP-MCNC: NEGATIVE MG/DL
HCT VFR BLD AUTO: 40.5 % (ref 34.8–46.1)
HGB BLD-MCNC: 13 G/DL (ref 11.5–15.4)
HGB UR QL STRIP.AUTO: ABNORMAL
IMM GRANULOCYTES # BLD AUTO: 0.07 THOUSAND/UL (ref 0–0.2)
IMM GRANULOCYTES NFR BLD AUTO: 1 % (ref 0–2)
KETONES UR STRIP-MCNC: NEGATIVE MG/DL
LEUKOCYTE ESTERASE UR QL STRIP: ABNORMAL
LIPASE SERPL-CCNC: 132 U/L (ref 73–393)
LYMPHOCYTES # BLD AUTO: 0.93 THOUSANDS/ΜL (ref 0.6–4.47)
LYMPHOCYTES NFR BLD AUTO: 10 % (ref 14–44)
MCH RBC QN AUTO: 31.6 PG (ref 26.8–34.3)
MCHC RBC AUTO-ENTMCNC: 32.1 G/DL (ref 31.4–37.4)
MCV RBC AUTO: 99 FL (ref 82–98)
MONOCYTES # BLD AUTO: 0.76 THOUSAND/ΜL (ref 0.17–1.22)
MONOCYTES NFR BLD AUTO: 8 % (ref 4–12)
NEUTROPHILS # BLD AUTO: 7.28 THOUSANDS/ΜL (ref 1.85–7.62)
NEUTS SEG NFR BLD AUTO: 80 % (ref 43–75)
NITRITE UR QL STRIP: NEGATIVE
NON-SQ EPI CELLS URNS QL MICRO: ABNORMAL /HPF
NRBC BLD AUTO-RTO: 0 /100 WBCS
PH UR STRIP.AUTO: 7.5 [PH] (ref 4.5–8)
PLATELET # BLD AUTO: 188 THOUSANDS/UL (ref 149–390)
PMV BLD AUTO: 9.8 FL (ref 8.9–12.7)
POTASSIUM SERPL-SCNC: 4.1 MMOL/L (ref 3.5–5.3)
PROT SERPL-MCNC: 7.8 G/DL (ref 6.4–8.2)
PROT UR STRIP-MCNC: ABNORMAL MG/DL
RBC # BLD AUTO: 4.11 MILLION/UL (ref 3.81–5.12)
RBC #/AREA URNS AUTO: ABNORMAL /HPF
SODIUM SERPL-SCNC: 142 MMOL/L (ref 136–145)
SP GR UR STRIP.AUTO: 1.01 (ref 1–1.03)
UROBILINOGEN UR QL STRIP.AUTO: 0.2 E.U./DL
WBC # BLD AUTO: 9.16 THOUSAND/UL (ref 4.31–10.16)
WBC #/AREA URNS AUTO: ABNORMAL /HPF

## 2018-11-12 PROCEDURE — 36415 COLL VENOUS BLD VENIPUNCTURE: CPT | Performed by: EMERGENCY MEDICINE

## 2018-11-12 PROCEDURE — 74176 CT ABD & PELVIS W/O CONTRAST: CPT

## 2018-11-12 PROCEDURE — 96361 HYDRATE IV INFUSION ADD-ON: CPT

## 2018-11-12 PROCEDURE — 83690 ASSAY OF LIPASE: CPT | Performed by: EMERGENCY MEDICINE

## 2018-11-12 PROCEDURE — 99222 1ST HOSP IP/OBS MODERATE 55: CPT | Performed by: PHYSICIAN ASSISTANT

## 2018-11-12 PROCEDURE — 72128 CT CHEST SPINE W/O DYE: CPT

## 2018-11-12 PROCEDURE — 85025 COMPLETE CBC W/AUTO DIFF WBC: CPT | Performed by: EMERGENCY MEDICINE

## 2018-11-12 PROCEDURE — 99285 EMERGENCY DEPT VISIT HI MDM: CPT

## 2018-11-12 PROCEDURE — 96374 THER/PROPH/DIAG INJ IV PUSH: CPT

## 2018-11-12 PROCEDURE — 80053 COMPREHEN METABOLIC PANEL: CPT | Performed by: EMERGENCY MEDICINE

## 2018-11-12 PROCEDURE — 81001 URINALYSIS AUTO W/SCOPE: CPT | Performed by: EMERGENCY MEDICINE

## 2018-11-12 RX ORDER — ACETAMINOPHEN 325 MG/1
650 TABLET ORAL EVERY 6 HOURS SCHEDULED
Status: DISCONTINUED | OUTPATIENT
Start: 2018-11-13 | End: 2018-11-16 | Stop reason: HOSPADM

## 2018-11-12 RX ORDER — METOPROLOL SUCCINATE 25 MG/1
25 TABLET, EXTENDED RELEASE ORAL DAILY
Status: DISCONTINUED | OUTPATIENT
Start: 2018-11-13 | End: 2018-11-16 | Stop reason: HOSPADM

## 2018-11-12 RX ORDER — MELATONIN
2000 DAILY
Status: DISCONTINUED | OUTPATIENT
Start: 2018-11-13 | End: 2018-11-16 | Stop reason: HOSPADM

## 2018-11-12 RX ORDER — HEPARIN SODIUM 5000 [USP'U]/ML
5000 INJECTION, SOLUTION INTRAVENOUS; SUBCUTANEOUS EVERY 8 HOURS SCHEDULED
Status: DISCONTINUED | OUTPATIENT
Start: 2018-11-12 | End: 2018-11-16 | Stop reason: HOSPADM

## 2018-11-12 RX ORDER — DIAZEPAM 5 MG/ML
2.5 INJECTION, SOLUTION INTRAMUSCULAR; INTRAVENOUS ONCE
Status: COMPLETED | OUTPATIENT
Start: 2018-11-12 | End: 2018-11-12

## 2018-11-12 RX ORDER — METHOCARBAMOL 500 MG/1
500 TABLET, FILM COATED ORAL EVERY 6 HOURS PRN
Status: DISCONTINUED | OUTPATIENT
Start: 2018-11-12 | End: 2018-11-16 | Stop reason: HOSPADM

## 2018-11-12 RX ORDER — AMLODIPINE BESYLATE 2.5 MG/1
2.5 TABLET ORAL DAILY
Status: DISCONTINUED | OUTPATIENT
Start: 2018-11-13 | End: 2018-11-16 | Stop reason: HOSPADM

## 2018-11-12 RX ORDER — ONDANSETRON 2 MG/ML
4 INJECTION INTRAMUSCULAR; INTRAVENOUS EVERY 6 HOURS PRN
Status: DISCONTINUED | OUTPATIENT
Start: 2018-11-12 | End: 2018-11-16 | Stop reason: HOSPADM

## 2018-11-12 RX ORDER — LIDOCAINE 50 MG/G
1 PATCH TOPICAL DAILY
Status: DISCONTINUED | OUTPATIENT
Start: 2018-11-13 | End: 2018-11-16 | Stop reason: HOSPADM

## 2018-11-12 RX ADMIN — Medication 2.5 MG: at 19:08

## 2018-11-12 RX ADMIN — SODIUM CHLORIDE 500 ML: 0.9 INJECTION, SOLUTION INTRAVENOUS at 19:07

## 2018-11-12 NOTE — ED PROVIDER NOTES
History  Chief Complaint   Patient presents with    Back Pain     Pt was just walking from her porch to her living room and had sudden lower back pain  Now severe pain with movement since  No trauma or injury  Pt  Was just walking and felt mid back pain  She took tylenol without relief of pain  No injury  No headache, no n/v/d, no fevers, no abd  Pain  No urinary symptoms  Pt  Lives by herself  Her niece and family check on her  No visiting nurses or aides  Pt  Always walks with a walker  She wasn't bending or lifting anything heavy  Prior to Admission Medications   Prescriptions Last Dose Informant Patient Reported? Taking? Cholecalciferol (VITAMIN D3) 2000 UNITS TABS 11/11/2018 at Unknown time  Yes Yes   Sig: Take 1 tablet by mouth daily  Multiple Vitamins-Minerals (CENTRUM SILVER ADULT 50+ PO) 11/11/2018 at Unknown time  Yes Yes   Sig: Take 1 tablet by mouth daily   NON FORMULARY 11/11/2018 at Unknown time  Yes Yes   Sig: areds 2 vitamin for eyes   amLODIPine (NORVASC) 2 5 mg tablet 11/11/2018 at Unknown time  No Yes   Sig: TAKE 1 TABLET DAILY   metoprolol succinate (TOPROL-XL) 25 mg 24 hr tablet 11/11/2018 at Unknown time  No Yes   Sig: TAKE 1 TABLET DAILY      Facility-Administered Medications: None       Past Medical History:   Diagnosis Date    Compression fracture     Aug 2015    COPD (chronic obstructive pulmonary disease) (HCC)     Hematuria     Hypertension     Osteopenia     Renal disorder        Past Surgical History:   Procedure Laterality Date    HIP SURGERY Left     fx       History reviewed  No pertinent family history  I have reviewed and agree with the history as documented  Social History   Substance Use Topics    Smoking status: Never Smoker    Smokeless tobacco: Never Used    Alcohol use No        Review of Systems   Constitutional: Negative for appetite change, fatigue and fever  HENT: Negative for rhinorrhea and sore throat      Respiratory: Negative for cough, shortness of breath and wheezing  Cardiovascular: Negative for chest pain and leg swelling  Gastrointestinal: Negative for abdominal pain, diarrhea and vomiting  Genitourinary: Negative for dysuria and flank pain  Musculoskeletal: Positive for back pain  Negative for neck pain  Skin: Negative for rash  Neurological: Negative for syncope and headaches  Psychiatric/Behavioral:        Mood normal       Physical Exam  Physical Exam   Constitutional: She is oriented to person, place, and time  She appears well-developed and well-nourished  HENT:   Head: Normocephalic and atraumatic  Neck: Normal range of motion  Neck supple  Cardiovascular: Normal rate and regular rhythm  Pulmonary/Chest: Effort normal and breath sounds normal    Abdominal: Soft  There is no tenderness  Musculoskeletal:   Mid T spine tenderness, +n/v intact, neg  St   Leg raising test    Neurological: She is alert and oriented to person, place, and time  Skin: Skin is warm and dry  Nursing note and vitals reviewed        Vital Signs  ED Triage Vitals   Temperature Pulse Respirations Blood Pressure SpO2   11/12/18 1810 11/12/18 1810 11/12/18 1810 11/12/18 1817 11/12/18 1810   (!) 97 4 °F (36 3 °C) 73 18 160/80 97 %      Temp Source Heart Rate Source Patient Position - Orthostatic VS BP Location FiO2 (%)   11/12/18 1810 11/12/18 1810 11/12/18 1810 11/12/18 1810 --   Oral Monitor Sitting Right arm       Pain Score       11/12/18 1810       Worst Possible Pain           Vitals:    11/12/18 1905 11/12/18 2015 11/12/18 2030 11/12/18 2040   BP: (!) 180/86   150/82   Pulse: 71 68 82 89   Patient Position - Orthostatic VS: Lying   Lying       Visual Acuity      ED Medications  Medications   sodium chloride 0 9 % bolus 500 mL (0 mL Intravenous Stopped 11/12/18 2037)   diazepam (VALIUM) injection 2 5 mg (2 5 mg Intravenous Given 11/12/18 1908)       Diagnostic Studies  Results Reviewed     Procedure Component Value Units Date/Time    Urine Microscopic [[id number]]  (Abnormal) Collected:  11/12/18 1945    Lab Status:  Final result Specimen:  Urine from Urine, Clean Catch Updated:  11/12/18 2011     RBC, UA 0-1 (A) /hpf      WBC, UA 0-5 /hpf      Epithelial Cells Occasional /hpf      Bacteria, UA None Seen /hpf     UA w Reflex to Microscopic w Reflex to Culture [[id number]]  (Abnormal) Collected:  11/12/18 1945    Lab Status:  Final result Specimen:  Urine from Urine, Clean Catch Updated:  11/12/18 1952     Color, UA Yellow     Clarity, UA Clear     Specific Gravity, UA 1 015     pH, UA 7 5     Leukocytes, UA Small (A)     Nitrite, UA Negative     Protein, UA Trace (A) mg/dl      Glucose, UA Negative mg/dl      Ketones, UA Negative mg/dl      Urobilinogen, UA 0 2 E U /dl      Bilirubin, UA Negative     Blood, UA Small (A)    Comprehensive metabolic panel [[id number]] Collected:  11/12/18 1909    Lab Status:  Final result Specimen:  Blood from Arm, Right Updated:  11/12/18 1929     Sodium 142 mmol/L      Potassium 4 1 mmol/L      Chloride 104 mmol/L      CO2 28 mmol/L      ANION GAP 10 mmol/L      BUN 23 mg/dL      Creatinine 1 13 mg/dL      Glucose 103 mg/dL      Calcium 9 3 mg/dL      AST 24 U/L      ALT 26 U/L      Alkaline Phosphatase 66 U/L      Total Protein 7 8 g/dL      Albumin 3 8 g/dL      Total Bilirubin 0 50 mg/dL      eGFR 42 ml/min/1 73sq m     Narrative:         National Kidney Disease Education Program recommendations are as follows:  GFR calculation is accurate only with a steady state creatinine  Chronic Kidney disease less than 60 ml/min/1 73 sq  meters  Kidney failure less than 15 ml/min/1 73 sq  meters      Lipase [[id number]]  (Normal) Collected:  11/12/18 1909    Lab Status:  Final result Specimen:  Blood from Arm, Right Updated:  11/12/18 1929     Lipase 132 u/L     CBC and differential [[id number]]  (Abnormal) Collected:  11/12/18 1909    Lab Status:  Final result Specimen:  Blood from Arm, Right Updated: 11/12/18 1913     WBC 9 16 Thousand/uL      RBC 4 11 Million/uL      Hemoglobin 13 0 g/dL      Hematocrit 40 5 %      MCV 99 (H) fL      MCH 31 6 pg      MCHC 32 1 g/dL      RDW 12 8 %      MPV 9 8 fL      Platelets 652 Thousands/uL      nRBC 0 /100 WBCs      Neutrophils Relative 80 (H) %      Immat GRANS % 1 %      Lymphocytes Relative 10 (L) %      Monocytes Relative 8 %      Eosinophils Relative 1 %      Basophils Relative 0 %      Neutrophils Absolute 7 28 Thousands/µL      Immature Grans Absolute 0 07 Thousand/uL      Lymphocytes Absolute 0 93 Thousands/µL      Monocytes Absolute 0 76 Thousand/µL      Eosinophils Absolute 0 09 Thousand/µL      Basophils Absolute 0 03 Thousands/µL                  CT thoracic spine without contrast   Final Result by Lexy Das MD (11/12 2033)         1  Chronic T12 compression fracture with significant interval loss of height and fracture fragment retropulsion resulting in 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  2   Mild, chronic T8 and T10 compression fractures  3   Airspace and endobronchial opacities throughout both lungs, most prominent at the lung bases suggesting acute and chronic aspiration pneumonia  4   Nonobstructing 3 mm left renal calculus  Workstation performed: [id number]         CT renal stone study abdomen pelvis without contrast   Final Result by Kate Hardin MD (11/12 2020)      No urinary tract calculi identified               Workstation performed: [id number]                    Procedures  Procedures       Phone Contacts  ED Phone Contact    ED Course                               MDM  Number of Diagnoses or Management Options  Central stenosis of spinal canal:   Compression fx, thoracic spine [facility]):   Gait instability:   Intractable pain:   T12 compression fracture [facility]):      Amount and/or Complexity of Data Reviewed  Clinical lab tests: ordered and reviewed  Tests in the radiology section of CPT®: ordered and reviewed    Risk of Complications, Morbidity, and/or Mortality  Presenting problems: moderate  General comments: Pt  Admitted for further workup      CritCare Time    Disposition  Final diagnoses:   T12 compression fracture ([address] )   Central stenosis of spinal canal   Compression fx, thoracic spine (HCC)   Gait instability   Intractable pain     Time reflects when diagnosis was documented in both MDM as applicable and the Disposition within this note     Time User Action Codes Description Comment    11/12/2018  8:59 PM Zev Adhikari R Add [S22 080A] T12 compression fracture ([facility] [address] )     11/12/2018  8:59 PM Beulah Adhikari Sox Add [M48 00] Central stenosis of spinal canal     11/12/2018  9:00 PM Zev Adhikari R Add [S22 000A] Compression fx, thoracic spine ([facility] [address] )     11/12/2018  9:00 PM Beulah Adhikari Sox Add [R26 81] Gait instability     11/12/2018  9:00 PM Zev Adhikari R Add [R52] Intractable pain       ED Disposition     ED Disposition Condition Comment    Admit  Case was discussed with MARYBEL and the patient's admission status was agreed to be inpt /med/surg  Follow-up Information    None         Current Discharge Medication List      CONTINUE these medications which have NOT CHANGED    Details   amLODIPine (NORVASC) 2 5 mg tablet TAKE 1 TABLET DAILY  Qty: 30 tablet, Refills: 5    Associated Diagnoses: Essential hypertension      Cholecalciferol (VITAMIN D3) 2000 UNITS TABS Take 1 tablet by mouth daily  metoprolol succinate (TOPROL-XL) 25 mg 24 hr tablet TAKE 1 TABLET DAILY  Qty: 30 tablet, Refills: 5    Associated Diagnoses: Essential hypertension      Multiple Vitamins-Minerals (CENTRUM SILVER ADULT 50+ PO) Take 1 tablet by mouth daily      NON FORMULARY areds 2 vitamin for eyes           No discharge procedures on file      ED Provider  Electronically Signed by           Amanda Pena MD  11/12/18 1784

## 2018-11-13 PROBLEM — M54.6 ACUTE THORACIC BACK PAIN: Status: ACTIVE | Noted: 2018-11-12

## 2018-11-13 LAB
ANION GAP SERPL CALCULATED.3IONS-SCNC: 10 MMOL/L (ref 4–13)
BASOPHILS # BLD AUTO: 0.03 THOUSANDS/ΜL (ref 0–0.1)
BASOPHILS NFR BLD AUTO: 1 % (ref 0–1)
BUN SERPL-MCNC: 17 MG/DL (ref 5–25)
CALCIUM SERPL-MCNC: 8.8 MG/DL (ref 8.3–10.1)
CHLORIDE SERPL-SCNC: 105 MMOL/L (ref 100–108)
CO2 SERPL-SCNC: 27 MMOL/L (ref 21–32)
CREAT SERPL-MCNC: 1.03 MG/DL (ref 0.6–1.3)
EOSINOPHIL # BLD AUTO: 0.21 THOUSAND/ΜL (ref 0–0.61)
EOSINOPHIL NFR BLD AUTO: 3 % (ref 0–6)
ERYTHROCYTE [DISTWIDTH] IN BLOOD BY AUTOMATED COUNT: 12.9 % (ref 11.6–15.1)
GFR SERPL CREATININE-BSD FRML MDRD: 47 ML/MIN/1.73SQ M
GLUCOSE SERPL-MCNC: 98 MG/DL (ref 65–140)
HCT VFR BLD AUTO: 36.9 % (ref 34.8–46.1)
HGB BLD-MCNC: 11.9 G/DL (ref 11.5–15.4)
IMM GRANULOCYTES # BLD AUTO: 0.04 THOUSAND/UL (ref 0–0.2)
IMM GRANULOCYTES NFR BLD AUTO: 1 % (ref 0–2)
LYMPHOCYTES # BLD AUTO: 1 THOUSANDS/ΜL (ref 0.6–4.47)
LYMPHOCYTES NFR BLD AUTO: 16 % (ref 14–44)
MCH RBC QN AUTO: 31.3 PG (ref 26.8–34.3)
MCHC RBC AUTO-ENTMCNC: 32.2 G/DL (ref 31.4–37.4)
MCV RBC AUTO: 97 FL (ref 82–98)
MONOCYTES # BLD AUTO: 0.64 THOUSAND/ΜL (ref 0.17–1.22)
MONOCYTES NFR BLD AUTO: 10 % (ref 4–12)
NEUTROPHILS # BLD AUTO: 4.31 THOUSANDS/ΜL (ref 1.85–7.62)
NEUTS SEG NFR BLD AUTO: 69 % (ref 43–75)
NRBC BLD AUTO-RTO: 0 /100 WBCS
PLATELET # BLD AUTO: 167 THOUSANDS/UL (ref 149–390)
PMV BLD AUTO: 9.9 FL (ref 8.9–12.7)
POTASSIUM SERPL-SCNC: 3.5 MMOL/L (ref 3.5–5.3)
PROCALCITONIN SERPL-MCNC: <0.05 NG/ML
RBC # BLD AUTO: 3.8 MILLION/UL (ref 3.81–5.12)
SODIUM SERPL-SCNC: 142 MMOL/L (ref 136–145)
WBC # BLD AUTO: 6.23 THOUSAND/UL (ref 4.31–10.16)

## 2018-11-13 PROCEDURE — G8997 SWALLOW GOAL STATUS: HCPCS

## 2018-11-13 PROCEDURE — 99222 1ST HOSP IP/OBS MODERATE 55: CPT | Performed by: NEUROLOGICAL SURGERY

## 2018-11-13 PROCEDURE — G8996 SWALLOW CURRENT STATUS: HCPCS

## 2018-11-13 PROCEDURE — 80048 BASIC METABOLIC PNL TOTAL CA: CPT | Performed by: PHYSICIAN ASSISTANT

## 2018-11-13 PROCEDURE — 84145 PROCALCITONIN (PCT): CPT | Performed by: PHYSICIAN ASSISTANT

## 2018-11-13 PROCEDURE — 85025 COMPLETE CBC W/AUTO DIFF WBC: CPT | Performed by: PHYSICIAN ASSISTANT

## 2018-11-13 PROCEDURE — 99232 SBSQ HOSP IP/OBS MODERATE 35: CPT | Performed by: INTERNAL MEDICINE

## 2018-11-13 PROCEDURE — 92610 EVALUATE SWALLOWING FUNCTION: CPT

## 2018-11-13 PROCEDURE — 99222 1ST HOSP IP/OBS MODERATE 55: CPT | Performed by: ORTHOPAEDIC SURGERY

## 2018-11-13 PROCEDURE — G8998 SWALLOW D/C STATUS: HCPCS

## 2018-11-13 RX ADMIN — ACETAMINOPHEN 650 MG: 325 TABLET ORAL at 05:14

## 2018-11-13 RX ADMIN — HEPARIN SODIUM 5000 UNITS: 5000 INJECTION, SOLUTION INTRAVENOUS; SUBCUTANEOUS at 00:06

## 2018-11-13 RX ADMIN — ACETAMINOPHEN 650 MG: 325 TABLET ORAL at 13:10

## 2018-11-13 RX ADMIN — HEPARIN SODIUM 5000 UNITS: 5000 INJECTION, SOLUTION INTRAVENOUS; SUBCUTANEOUS at 21:22

## 2018-11-13 RX ADMIN — LIDOCAINE 1 PATCH: 50 PATCH CUTANEOUS at 00:07

## 2018-11-13 RX ADMIN — HEPARIN SODIUM 5000 UNITS: 5000 INJECTION, SOLUTION INTRAVENOUS; SUBCUTANEOUS at 13:10

## 2018-11-13 RX ADMIN — ACETAMINOPHEN 650 MG: 325 TABLET ORAL at 17:52

## 2018-11-13 RX ADMIN — AMLODIPINE BESYLATE 2.5 MG: 2.5 TABLET ORAL at 08:30

## 2018-11-13 RX ADMIN — METOPROLOL SUCCINATE 25 MG: 25 TABLET, EXTENDED RELEASE ORAL at 08:30

## 2018-11-13 RX ADMIN — METHOCARBAMOL TABLETS 500 MG: 500 TABLET, COATED ORAL at 13:10

## 2018-11-13 RX ADMIN — ACETAMINOPHEN 650 MG: 325 TABLET ORAL at 00:06

## 2018-11-13 RX ADMIN — HEPARIN SODIUM 5000 UNITS: 5000 INJECTION, SOLUTION INTRAVENOUS; SUBCUTANEOUS at 05:14

## 2018-11-13 RX ADMIN — VITAMIN D, TAB 1000IU (100/BT) 2000 UNITS: 25 TAB at 08:30

## 2018-11-13 NOTE — ASSESSMENT & PLAN NOTE
· Thoracic CT shows "Airspace and endobronchial opacities throughout both lungs, most prominent at the lung bases suggesting acute and chronic aspiration pneumonia"  · In the setting of bronchiectasis  · CBC without leukocytosis, afebrile  · No hypoxia, no tachycardia/tachypnea  · Will obtain procalcitonin and monitor off abx at this time   · Speech eval   · If any SIRS criteria develops, will consider starting abx

## 2018-11-13 NOTE — ASSESSMENT & PLAN NOTE
· Nontraumatic onset  · CT shows "Chronic T12 compression fracture with significant interval loss of height and fracture fragment retropulsion resulting in 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"  · Mild, chronic T8 and T10 compression fractures    · UA unremarkable, no leukocytosis, afebrile  · May benefit from inpatient MRI  · Will appreciate Ortho input  · PT/OT eval   · Pain control, muscle relaxers prn

## 2018-11-13 NOTE — OCCUPATIONAL THERAPY NOTE
Occupational Therapy Note        Patient Name: Юлия Johns  AVVUI'C Date: 11/13/2018    OT orders received and chart review completed  Pt is awaiting TLSO brace  Will continue to follow pt to complete eval as appropriate and schedule allows following TLSO brace      [facility], OTR/L

## 2018-11-13 NOTE — PROGRESS NOTES
Progress Note - Sallie Brower 11/20/1925, 80 y o  female MRN: [id number]    Unit/Bed#: -01 Encounter: [id number]    Primary Care Provider: Kirk Soria MD   Date and time admitted to hospital: 11/12/2018  6:08 PM        * Acute thoracic back pain   Assessment & Plan    · History of falls  · CT shows "Chronic T12 compression fracture with significant interval loss of height and fracture fragment retropulsion resulting in 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"  · Mild, chronic T8 and T10 compression fractures  · UA unremarkable, no leukocytosis, afebrile  · Ortho consulted  · Recommending neurosurgery consult  · PT/OT eval   · Pain control, muscle relaxers prn      Essential hypertension   Assessment & Plan    · Stable, continue amlodipine     Chronic kidney disease, stage 3 (HCC)   Assessment & Plan    · Renal function at baseline, will monitor BMP     Abnormal chest CT   Assessment & Plan    · Thoracic CT shows "Airspace and endobronchial opacities throughout both lungs, most prominent at the lung bases suggesting acute and chronic aspiration pneumonia"  · In the setting of bronchiectasis  · CBC without leukocytosis, afebrile  · No hypoxia, no tachycardia/tachypnea  ·  Procalcitonin normal  · Speech eval   · If any SIRS criteria develops, will consider starting abx       VTE Pharmacologic Prophylaxis:   Pharmacologic: Heparin  Mechanical VTE Prophylaxis in Place: Yes    Patient Centered Rounds: I have performed bedside rounds with nursing staff today  Discussions with Specialists or Other Care Team Provider:  Nursing    Education and Discussions with Family / Patient:  Patient    Time Spent for Care: 20 minutes  More than 50% of total time spent on counseling and coordination of care as described above      Current Length of Stay: 1 day(s)    Current Patient Status: Inpatient   Certification Statement: The patient will continue to require additional inpatient hospital stay due to Back pain requiring further workup    Discharge Plan:  Short-term rehab once clinically stable    Code Status: Level 1 - Full Code      Subjective:   Reports pain is much improved  Objective:     Vitals:   Temp (24hrs), Av °F (36 7 °C), Min:97 4 °F (36 3 °C), Max:98 4 °F (36 9 °C)    Temp:  [97 4 °F (36 3 °C)-98 4 °F (36 9 °C)] 98 2 °F (36 8 °C)  HR:  [64-89] 79  Resp:  [14-20] 18  BP: (148-180)/(72-86) 166/72  SpO2:  [93 %-97 %] 94 %  Body mass index is 21 92 kg/m²  Input and Output Summary (last 24 hours): Intake/Output Summary (Last 24 hours) at 18 1506  Last data filed at 18 1421   Gross per 24 hour   Intake              800 ml   Output              328 ml   Net              472 ml       Physical Exam:     Physical Exam     Gen -Patient comfortable sitting in chair  Neck- Supple  No thyromegaly or lymphadenopathy  Lungs-Clear bilaterally without any wheeze or rales   Heart S1-S2, regular rate and rhythm, no murmurs  Abdomen-soft nontender, no organomegaly  Bowel sounds present  Extremities-no cyanosi,  clubbing or edema  Skin- no rash  Neuro-awake alert and oriented    Additional Data:     Labs:      Results from last 7 days  Lab Units 18  0542   WBC Thousand/uL 6 23   HEMOGLOBIN g/dL 11 9   HEMATOCRIT % 36 9   PLATELETS Thousands/uL 167   NEUTROS PCT % 69   LYMPHS PCT % 16   MONOS PCT % 10   EOS PCT % 3       Results from last 7 days  Lab Units 18  0542 18  1909   POTASSIUM mmol/L 3 5 4 1   CHLORIDE mmol/L 105 104   CO2 mmol/L 27 28   BUN mg/dL 17 23   CREATININE mg/dL 1 03 1 13   ANION GAP mmol/L 10 10   CALCIUM mg/dL 8 8 9 3   ALBUMIN g/dL  --  3 8   TOTAL BILIRUBIN mg/dL  --  0 50   ALK PHOS U/L  --  66   ALT U/L  --  26   AST U/L  --  24                   Results from last 7 days  Lab Units 18  0542   PROCALCITONIN ng/ml <0 05           * I Have Reviewed All Lab Data Listed Above  * Additional Pertinent Lab Tests Reviewed:  All Labs Within Last 24 Hours Reviewed    Imaging:    Imaging Reports Reviewed Today Include:   Imaging Personally Reviewed by Myself Includes:      Recent Cultures (last 7 days):           Last 24 Hours Medication List:     Current Facility-Administered Medications:  acetaminophen 650 mg Oral Q6H [address] Tawana Devlin PA-C   amLODIPine 2 5 mg Oral Daily Marilee Leung PA-C   cholecalciferol 2,000 Units Oral Daily Tawana Krause PA-C   heparin (porcine) 5,000 Units Subcutaneous Q8H [address] Tawana Krause PA-C   lidocaine 1 patch Topical Daily Tawana Krause PA-C   methocarbamol 500 mg Oral Q6H PRN Marilee Leung PA-C   metoprolol succinate 25 mg Oral Daily Tawana Devlin PA-C   ondansetron 4 mg Intravenous Q6H PRN Marilee Leung PA-C        Today, Patient Was Seen By: Velvet Poole MD    ** Please Note: Dictation voice to text software may have been used in the creation of this document   **

## 2018-11-13 NOTE — PHYSICAL THERAPY NOTE
PHYSICAL THERAPY NOTE  Patient Name: Valentin Peck  CSZHS'Q Date: 11/13/2018  Attempted to see pt now that she was fit with TLSO brace  Noted that pt now has neurosurgery consult in computer ordered by Dr Jorge Luis Mansfield around the same time that Dr Cornelia Nicolas attested the PA's note this pm recommending the same  Dr Cornelia Nicolas on do not disturb via tiger text and no response when calling [phone] phone  Did talk to Dr Jorge Luis Mansfield who agreed to have PT cancel eval until seen by Neurosurgery  Will follow   Rayna Marte, PT

## 2018-11-13 NOTE — UTILIZATION REVIEW
Initial Clinical Review    Admission: Date/Time/Statement: 11/12/18 @ 2059     Orders Placed This Encounter   Procedures    Inpatient Admission (expected length of stay for this patient is greater than two midnights)     Standing Status:   Standing     Number of Occurrences:   1     Order Specific Question:   Admitting Physician     Answer:   Kathia Lee     Order Specific Question:   Level of Care     Answer:   Med Surg [16]     Order Specific Question:   Estimated length of stay     Answer:   More than 2 Midnights     Order Specific Question:   Certification     Answer:   I certify that inpatient services are medically necessary for this patient for a duration of greater than two midnights  See H&P and MD Progress Notes for additional information about the patient's course of treatment  ED: Date/Time/Mode of Arrival:   ED Arrival Information     Expected Arrival Acuity Means of Arrival Escorted By Service Admission Type    - 11/12/2018 18:08 Urgent Ambulance [facility] Hospitalist Urgent    Arrival Complaint    -          Chief Complaint:   Chief Complaint   Patient presents with    Back Pain     Pt was just walking from her porch to her living room and had sudden lower back pain  Now severe pain with movement since  No trauma or injury  History of Illness:  80 y o  female with PMHx of HTN who presents with thoracic back pain  Patient states today she was walking back from grabbing her mail from the door when she suddenly developed mid back pain  She says she did not fall and there was no trauma to the area  She tried to lay down and take a nap but when she woke up her pain was worse  She says any movement makes her pain worse and she cannot lie on her side due to the pain  She says after she woke up she tried to sit in the recliner but the pain only progressed  She took two tylenol without relief    When she had difficulty standing up from the recliner due to her pain, she decided to come to the ED  She says when lying flat on her back, her pain is not as severe but otherwise she cannot move  ED Vital Signs:   ED Triage Vitals   Temperature Pulse Respirations Blood Pressure SpO2   11/12/18 1810 11/12/18 1810 11/12/18 1810 11/12/18 1817 11/12/18 1810   (!) 97 4 °F (36 3 °C) 73 18 160/80 97 %      Temp Source Heart Rate Source Patient Position - Orthostatic VS BP Location FiO2 (%)   11/12/18 1810 11/12/18 1810 11/12/18 1810 11/12/18 1810 --   Oral Monitor Sitting Right arm       Pain Score       11/12/18 1810       Worst Possible Pain        Wt Readings from Last 1 Encounters:   11/12/18 52 6 kg (116 lb)       Vital Signs (abnormal): maximum /86    Abnormal Labs/Diagnostic Test Results:   UA small leukocytes  Trace protein  Small blood  Ct abdomen - No urinary tract calculi identified    Ct thoracic spine - Chronic T12 compression fracture with significant interval loss of height and fracture fragment retropulsion resulting in 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  2   Mild, chronic T8 and T10 compression fractures  3   Airspace and endobronchial opacities throughout both lungs, most prominent at the lung bases suggesting acute and chronic aspiration pneumonia  4   Nonobstructing 3 mm left renal calculus  11/13/2018-  Wbc 6 23          ED Treatment:   Medication Administration from 11/12/2018 1807 to 11/12/2018 2148       Date/Time Order Dose Route Action Action by Comments     11/12/2018 2037 sodium chloride 0 9 % bolus 500 mL 0 mL Intravenous Stopped Manju Matthew RN      11/12/2018 1907 sodium chloride 0 9 % bolus 500 mL 500 mL Intravenous New Bag Manju Matthew RN      11/12/2018 1908 diazepam (VALIUM) injection 2 5 mg 2 5 mg Intravenous Given Manju Matthew RN           Past Medical/Surgical History:   Past Medical History:   Diagnosis Date    Compression fracture     COPD (chronic obstructive pulmonary disease) (HCC)     Hematuria  Hypertension     Osteopenia     Renal disorder        Admitting Diagnosis: Lower back pain [M54 5]  Gait instability [R26 81]  Compression fx, thoracic spine (HCC) [S22 000A]  T12 compression fracture ([facility]) [S22 080A]  Intractable pain [R52]  Central stenosis of spinal canal [M48 00]    Age/Sex: 80 y o  female    Assessment/Plan:   * Lower back pain   Assessment & Plan     · Nontraumatic onset  ? CT shows "Chronic T12 compression fracture with significant interval loss of height and fracture fragment retropulsion resulting in 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"  ? Mild, chronic T8 and T10 compression fractures  ? UA unremarkable, no leukocytosis, afebrile  · May benefit from inpatient MRI  ? Will appreciate Ortho input  · PT/OT eval   · Pain control, muscle relaxers prn       Abnormal chest CT   Assessment & Plan     · Thoracic CT shows "Airspace and endobronchial opacities throughout both lungs, most prominent at the lung bases suggesting acute and chronic aspiration pneumonia"  ? In the setting of bronchiectasis  · CBC without leukocytosis, afebrile  ? No hypoxia, no tachycardia/tachypnea  · Will obtain procalcitonin and monitor off abx at this time   · Speech eval   · If any SIRS criteria develops, will consider starting abx      Chronic kidney disease, stage 3 ([facility])   Assessment & Plan     · Renal function at baseline, will monitor BMP      Essential hypertension   Assessment & Plan     · Stable, continue amlodipine               Admission Orders:  11/12/2018  2100 INPATIENT  Scheduled Meds:   Current Facility-Administered Medications:  acetaminophen 650 mg Oral Q6H [address]   amLODIPine 2 5 mg Oral Daily   cholecalciferol 2,000 Units Oral Daily   heparin (porcine) 5,000 Units Subcutaneous Q8H [address]   lidocaine 1 patch Topical Daily   methocarbamol 500 mg Oral Q6H PRN   metoprolol succinate 25 mg Oral Daily   ondansetron 4 mg Intravenous Q6H PRN     Continuous Infusions:    PRN Meds: not used       TLSO spine brace  Consult orthopedics     PT/OT/speech

## 2018-11-13 NOTE — ASSESSMENT & PLAN NOTE
· Thoracic CT shows "Airspace and endobronchial opacities throughout both lungs, most prominent at the lung bases suggesting acute and chronic aspiration pneumonia"  · In the setting of bronchiectasis  · CBC without leukocytosis, afebrile  · No hypoxia, no tachycardia/tachypnea  ·  Procalcitonin normal  · Speech eval   · If any SIRS criteria develops, will consider starting abx

## 2018-11-13 NOTE — H&P
H&P- Kailey Martinez 11/20/1925, 80 y o  female MRN: [id number]    Unit/Bed#: -01 Encounter: [id number]    Primary Care Provider: Perico Syed MD   Date and time admitted to hospital: 11/12/2018  6:08 PM    * Lower back pain   Assessment & Plan    · Nontraumatic onset  · CT shows "Chronic T12 compression fracture with significant interval loss of height and fracture fragment retropulsion resulting in 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"  · Mild, chronic T8 and T10 compression fractures  · UA unremarkable, no leukocytosis, afebrile  · May benefit from inpatient MRI  · Will appreciate Ortho input  · PT/OT eval   · Pain control, muscle relaxers prn      Abnormal chest CT   Assessment & Plan    · Thoracic CT shows "Airspace and endobronchial opacities throughout both lungs, most prominent at the lung bases suggesting acute and chronic aspiration pneumonia"  · In the setting of bronchiectasis  · CBC without leukocytosis, afebrile  · No hypoxia, no tachycardia/tachypnea  · Will obtain procalcitonin and monitor off abx at this time   · Speech eval   · If any SIRS criteria develops, will consider starting abx     Chronic kidney disease, stage 3 (HCC)   Assessment & Plan    · Renal function at baseline, will monitor BMP     Essential hypertension   Assessment & Plan    · Stable, continue amlodipine       VTE Prophylaxis: Heparin  / reason for no mechanical VTE prophylaxis : ambulate when possible   Code Status: FULL  POLST: POLST form is not discussed and not completed at this time  Discussion with family: none    Anticipated Length of Stay:  Patient will be admitted on an Inpatient basis with an anticipated length of stay of  > 2 midnights  Justification for Hospital Stay: per plan above    Total Time for Visit, including Counseling / Coordination of Care: 30 minutes  Greater than 50% of this total time spent on direct patient counseling and coordination of care      Chief Complaint:   Back pain    History of Present Illness:    aVlentin Peck is a 80 y o  female with PMHx of HTN who presents with thoracic back pain  Patient states today she was walking back from grabbing her mail from the door when she suddenly developed mid back pain  She says she did not fall and there was no trauma to the area  She tried to lay down and take a nap but when she woke up her pain was worse  She says any movement makes her pain worse and she cannot lie on her side due to the pain  She says after she woke up she tried to sit in the recliner but the pain only progressed  She took two tylenol without relief  When she had difficulty standing up from the recliner due to her pain, she decided to come to the ED  She denies any weakness, numbness, paraesthesias of her lower extremities  Denies urinary symptoms, cough, congestion, chest pain or SOB  Denies HA or syncope  Denies fever or chills  Denies incontinence or genital numbness  She says when lying flat on her back, her pain is not as severe but otherwise she cannot move  She denies n/v, abdominal pain or diarrhea  Review of Systems:    Review of Systems   Constitutional: Negative  HENT: Negative  Eyes: Negative  Respiratory: Negative  Cardiovascular: Negative  Gastrointestinal: Negative  Endocrine: Negative  Genitourinary: Negative  Musculoskeletal: Positive for back pain  Skin: Negative  Allergic/Immunologic: Negative  Neurological: Negative  Hematological: Negative  Psychiatric/Behavioral: Negative          Past Medical and Surgical History:     Past Medical History:   Diagnosis Date    Compression fracture     Aug 2015    COPD (chronic obstructive pulmonary disease) ([facility] [address] )     Hematuria     Hypertension     Osteopenia     Renal disorder        Past Surgical History:   Procedure Laterality Date    HIP SURGERY Left     fx       Meds/Allergies:    Prior to Admission medications    Medication Sig Start Date End Date Taking? Authorizing Provider   amLODIPine (NORVASC) 2 5 mg tablet TAKE 1 TABLET DAILY 1/62/28  Yes Karen Serra MD   Cholecalciferol (VITAMIN D3) 2000 UNITS TABS Take 1 tablet by mouth daily  Yes Historical Provider, MD   metoprolol succinate (TOPROL-XL) 25 mg 24 hr tablet TAKE 1 TABLET DAILY 8/99/49  Yes Karen Serra MD   Multiple Vitamins-Minerals (CENTRUM SILVER ADULT 50+ PO) Take 1 tablet by mouth daily   Yes Historical Provider, MD   NON FORMULARY areds 2 vitamin for eyes   Yes Historical Provider, MD   acetaminophen (TYLENOL) 325 mg tablet Take 2 tablets (650 mg total) by mouth every 6 (six) hours as needed for mild pain, headaches or fever  6/28/16 11/12/18  Alysia Miner MD   amLODIPine (NORVASC) 2 5 mg tablet TAKE 1 TABLET DAILY 10/26/18 76/20/01  Karen Serra MD   amLODIPine (NORVASC) 2 5 mg tablet TAKE 1 TABLET DAILY 10/29/18 73/45/18  Karen Serra MD   metoprolol succinate (TOPROL-XL) 25 mg 24 hr tablet TAKE 1 TABLET DAILY 10/29/18 25/80/44  Karen Serra MD     I have reviewed home medications with patient personally  Allergies:    Allergies   Allergen Reactions    Codeine GI Intolerance     Reaction Date: [id number];     Vicodin [Hydrocodone-Acetaminophen]        Social History:     Marital Status: Single   Occupation: retired  Patient Pre-hospital Living Situation: home alone  Patient Pre-hospital Level of Mobility: ambulates with walker  Patient Pre-hospital Diet Restrictions: none  Substance Use History:   History   Alcohol Use No     History   Smoking Status    Never Smoker   Smokeless Tobacco    Never Used     History   Drug Use No       Family History:    non-contributory    Physical Exam:     Vitals:   Blood Pressure: 150/82 (11/12/18 2040)  Pulse: 89 (11/12/18 2040)  Temperature: (!) 97 4 °F (36 3 °C) (11/12/18 1810)  Temp Source: Oral (11/12/18 1810)  Respirations: 16 (11/12/18 2040)  Weight - Scale: 52 6 kg (116 lb) (11/12/18 1810)  SpO2: 94 % (11/12/18 2040)    Physical Exam   Constitutional: She appears well-developed  No distress  Cachectic   HENT:   Head: Normocephalic  Mouth/Throat: Oropharynx is clear and moist    Cardiovascular: Normal rate, regular rhythm, normal heart sounds and intact distal pulses  Exam reveals no gallop and no friction rub  No murmur heard  Pulmonary/Chest: Effort normal and breath sounds normal  No respiratory distress  She has no wheezes  She has no rales  She exhibits no tenderness  Abdominal: Soft  Bowel sounds are normal  She exhibits no distension and no mass  There is no tenderness  There is no rebound and no guarding  Musculoskeletal: She exhibits no edema  Thoracic back: She exhibits decreased range of motion (2/2 pain)  Bony tenderness: No midline tenderness  Neurological: She is alert  She has normal strength  No sensory deficit  Skin: Skin is warm and dry  No rash noted  She is not diaphoretic  No erythema  No pallor  Psychiatric: She has a normal mood and affect  Her behavior is normal    Nursing note and vitals reviewed  Additional Data:     Lab Results: I have personally reviewed pertinent reports  Results from last 7 days  Lab Units 11/12/18  1909   WBC Thousand/uL 9 16   HEMOGLOBIN g/dL 13 0   HEMATOCRIT % 40 5   PLATELETS Thousands/uL 188   NEUTROS ABS Thousands/µL 7 28   NEUTROS PCT % 80*   LYMPHS PCT % 10*   MONOS PCT % 8   EOS PCT % 1       Results from last 7 days  Lab Units 11/12/18  1909   POTASSIUM mmol/L 4 1   CHLORIDE mmol/L 104   CO2 mmol/L 28   BUN mg/dL 23   CREATININE mg/dL 1 13   ANION GAP mmol/L 10   CALCIUM mg/dL 9 3   ALBUMIN g/dL 3 8   TOTAL BILIRUBIN mg/dL 0 50   ALK PHOS U/L 66   ALT U/L 26   AST U/L 24                       Imaging: I have personally reviewed pertinent reports  CT thoracic spine without contrast   Final Result by Nora Delgado MD (11/12 2033)         1    Chronic T12 compression fracture with significant interval loss of height and fracture fragment retropulsion resulting in 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  2   Mild, chronic T8 and T10 compression fractures  3   Airspace and endobronchial opacities throughout both lungs, most prominent at the lung bases suggesting acute and chronic aspiration pneumonia  4   Nonobstructing 3 mm left renal calculus  Workstation performed: [id number]         CT renal stone study abdomen pelvis without contrast   Final Result by Cheryl Enriquez MD (11/12 2020)      No urinary tract calculi identified  Workstation performed: [id number]             Allscripts / Epic Records Reviewed: Yes     ** Please Note: This note has been constructed using a voice recognition system   **

## 2018-11-13 NOTE — SOCIAL WORK
LOS 1, Bundle Patient: No, 30 day readmission: No    CM met briefly with pt at bedside  Pt stated she lives alone in a 2 story home with 2 steps to enter through back door and  with BR upstairs  Pt stated she is able to ambulate and perform ADL's independently  Pt uses a walker for ambulation  Pt refusing STR but in agreement to VNA/PT  Pt's nilauren, [facility] telephoned pt while CM in room and asked to speak with CM  [facility] stated she is POA and she feels pt is no longer safe at home and should go to STR  [facility] continued pt was in jail and STR in past but she signed out and went home  TLSO brace ordered by ortho  PT following for brace placement and PT eval once pt cleared by neuro surgery  CM to follow up with pt, geneva [facility] and PT reccomendations

## 2018-11-13 NOTE — CONSULTS
Consultation - Neurosurgery   Gary Quiñones 80 y o  female MRN: [id number]  Unit/Bed#: -01 Encounter: [id number]      Assessment/Plan     Assessment:  T12 chronic fracture with acute back pain, no obvious neuro deficit    Plan:  Given patient age and lack of significant symptoms other than pain, recommend conservative management  Patient as well as family decline any surgical intervention    Recommend brace as needed for pain relief  Recommend PT eval, fall risk assessment    History of Present Illness     HPI: Gary Quiñones is a 80y o  year old female who presents with acute onset of back pain she has a history of prior fracture  She developed worsening pain however at this time her pain is relatively well controlled  She denies any lower extremity weakness or paresthesias  No subjective bowel or bladder issues  Consults    Review of Systems   Constitutional: Negative  HENT: Negative  Respiratory: Negative  Cardiovascular: Negative  Genitourinary: Negative  Musculoskeletal: Positive for back pain  Neurological: Negative for weakness and numbness  Historical Information   Past Medical History:   Diagnosis Date    Compression fracture     Aug 2015    COPD (chronic obstructive pulmonary disease) (HCC)     Hematuria     Hypertension     Osteopenia     Renal disorder      Past Surgical History:   Procedure Laterality Date    HIP SURGERY Left     fx     History   Alcohol Use No     History   Drug Use No     History   Smoking Status    Never Smoker   Smokeless Tobacco    Never Used     History reviewed  No pertinent family history      Meds/Allergies   all current active meds have been reviewed  Allergies   Allergen Reactions    Codeine GI Intolerance     Reaction Date: [id number];     Vicodin [Hydrocodone-Acetaminophen]        Objective     Intake/Output Summary (Last 24 hours) at 11/13/18 1712  Last data filed at 11/13/18 1421   Gross per 24 hour   Intake              800 ml   Output              328 ml   Net              472 ml       Physical Exam   Constitutional: She is oriented to person, place, and time  No distress  HENT:   Head: Normocephalic and atraumatic  Eyes: Pupils are equal, round, and reactive to light  EOM are normal    Cardiovascular: Normal rate  Pulmonary/Chest: Effort normal    Abdominal: Soft  Neurological: She is alert and oriented to person, place, and time  She has normal strength  No cranial nerve deficit or sensory deficit  She exhibits normal muscle tone  Neurologic Exam     Mental Status   Oriented to person, place, and time  Cranial Nerves     CN III, IV, VI   Pupils are equal, round, and reactive to light  Extraocular motions are normal      Motor Exam     Strength   Strength 5/5 throughout  Vitals:Blood pressure 166/72, pulse 79, temperature 98 2 °F (36 8 °C), resp  rate 18, weight 52 6 kg (116 lb), SpO2 94 %  ,Body mass index is 21 92 kg/m²  Lab Results: I have personally reviewed pertinent results  Imaging Studies: I have personally reviewed pertinent reports  EKG, Pathology, and Other Studies: I have personally reviewed pertinent reports  VTE Prophylaxis: Sequential compression device Justin Avila)     Code Status: Level 1 - Full Code  Advance Directive and Living Will: Yes    Power of :    POLST:      Counseling / Coordination of Care  Counseling/Coordination of Care: Total floor / unit time spent today 45 minutes  Greater than 50% of total time was spent with the patient and / or family counseling and / or coordination of care  A description of the counseling / coordination of care: see assessment and plan documentation above for description

## 2018-11-13 NOTE — ASSESSMENT & PLAN NOTE
· History of falls  · CT shows "Chronic T12 compression fracture with significant interval loss of height and fracture fragment retropulsion resulting in 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"  · Mild, chronic T8 and T10 compression fractures    · UA unremarkable, no leukocytosis, afebrile  · Ortho consulted  · Recommending neurosurgery consult  · PT/OT eval   · Check postvoid residuals  · Pain control, muscle relaxers prn

## 2018-11-13 NOTE — SPEECH THERAPY NOTE
Speech-Language Pathology Bedside Swallow Evaluation        Patient Name: Lyn HEART Date: 11/13/2018     Problem List  Patient Active Problem List   Diagnosis    Chronic pain    Bronchiectasis without complication ([facility] [address] )    Essential hypertension    Fall    Near syncope    Chronic kidney disease, stage 3 ([facility] [address] )    Hyponatremia    Urinary retention    Protein-calorie malnutrition, mild (HCC)    Non-seasonal allergic rhinitis    Lower back pain    Abnormal chest CT       Past Medical History  Past Medical History:   Diagnosis Date    Compression fracture     Aug 2015    COPD (chronic obstructive pulmonary disease) ([facility] [address] )     Hematuria     Hypertension     Osteopenia     Renal disorder        Past Surgical History  Past Surgical History:   Procedure Laterality Date    HIP SURGERY Left     fx       Summary    Pt presents with functional oral and pharyngeal stages of swallowing  Pt may have a very mild pharyngeal dysphagia due to occas throat clearing w/ mixed consistencies and given pt's reports of occas throat clearing when eating and needing to take a drink to clear  However no overt s/s aspiration noted  Recommendations:   Diet: regular diet and thin liquids   Meds: as tolerated   Frequent Oral care: as per protocol  Other Recommendations/ considerations: no further follow up needed  Current Medical Status  Pt is a 80 y o  female who presented to [address] [place] [address] with lower back pain  Patient states today she was walking back from grabbing her mail from the door when she suddenly developed mid back pain  She says she did not fall and there was no trauma to the area  She tried to lay down and take a nap but when she woke up her pain was worse  She says any movement makes her pain worse and she cannot lie on her side due to the pain  She says after she woke up she tried to sit in the recliner but the pain only progressed  She took two tylenol without relief    When she had difficulty standing up from the recliner due to her pain, she decided to come to the ED  She denies any weakness, numbness, paraesthesias of her lower extremities  Denies urinary symptoms, cough, congestion, chest pain or SOB  Denies HA or syncope  Denies fever or chills  Denies incontinence or genital numbness  She says when lying flat on her back, her pain is not as severe but otherwise she cannot move  She denies n/v, abdominal pain or diarrhea  Past medical history:   Please see H&P for details    Special Studies:  CT-thoracic spine: 11/12/18 1  Chronic T12 compression fracture with significant interval loss of height and fracture fragment retropulsion resulting in 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  2   Mild, chronic T8 and T10 compression fractures  3   Airspace and endobronchial opacities throughout both lungs, most prominent at the lung bases suggesting acute and chronic aspiration pneumonia  CT-A/P: 6/18/16  Bibasilar bronchiectasis and left to the right mucoid impaction with associated left basilar airspace opacity most suspicious for pneumonitis including possible aspiration pneumonitis  Underlying neoplastic process difficult to entirely exclude given somewhat masslike configuration        Social/Education/Vocational Hx:  Pt lives alone    Swallow Information   Current Risks for Dysphagia & Aspiration: general debilitation  Current Symptoms/Concerns: findings on CT thoracic spine  Current Diet: regular diet and thin liquids   Baseline Diet: regular diet and thin liquids    Baseline Assessment   Behavior/Cognition: alert  Speech/Language Status: able to participate in conversation and able to follow commands  Patient Positioning: upright in chair     Swallow Mechanism Exam   Facial: symmetrical  Labial: WFL  Lingual: WFL  Velum: unable to visualize  Mandible: adequate ROM  Dentition: adequate  Vocal quality:clear/adequate   Volitional Cough: weak   Respiratory: RA    Consistencies Assessed and Performance   Consistencies Administered: pt seen at lunch w/ chicken noodle soup, few bites of grilled ham and cheese, lemonade by cup and water by straw  Oral Stage: able to bite sand, self feed soup and drink from and cup/straw  Pt stated she does not usually drink from straw  Pt w/ slow but functional mastication, manipulation  Good oral control for small sips of liquids  Adequate transfer  Pharyngeal Stage: swallow initiation was timely and complete  Mild throat clearing noted x2 w/ soup (mixed consistency), but no coughing, choking, wet voice or throat clearing noted w/ other consistencies         Esophageal Concerns: none reported      Results Reviewed with: patient

## 2018-11-13 NOTE — CONSULTS
Orthopedics   Angela Chaudhry 80 y o  female MRN: [id number]  Unit/Bed#: -01      Chief Complaint:   Back Pain    HPI:   80 y  o female complaining of Thoracic back pain  Patient reports the pain began yesterday around 12 noon she was walking out to get her house  She denies falls or traumas prior to onset of this pain  About 2 years ago back reports she has never treated back injuries  She says the pain sharp intermittent localized to the mid back  She denies any radiation of the pain down the front of body or down legs  The pain is about 10/10 at worst, 0/10 at best   It is made worse by attempting to rotate or flex or extend the back, it is better with rest medications  She denies any numbness or tingling  She reports she has had chronic weakness the back and legs over the last 2 years but denies any worsening of this since yesterday and denies any acute changes to her bowels or bladder  Review Of Systems:   · Skin: Normal  · Neuro: See HPI  · Musculoskeletal: See HPI  · 14 point review of systems negative except as stated above     Past Medical History:   Past Medical History:   Diagnosis Date    Compression fracture     Aug 2015    COPD (chronic obstructive pulmonary disease) ([facility] [address] )     Hematuria     Hypertension     Osteopenia     Renal disorder        Past Surgical History:   Past Surgical History:   Procedure Laterality Date    HIP SURGERY Left     fx       Family History:  Family history reviewed and non-contributory  History reviewed  No pertinent family history  Social History:  Social History     Social History    Marital status: Single     Spouse name: N/A    Number of children: N/A    Years of education: N/A     Social History Main Topics    Smoking status: Never Smoker    Smokeless tobacco: Never Used    Alcohol use No    Drug use: No    Sexual activity: Not Asked     Other Topics Concern    None     Social History Narrative    None       Allergies:    Allergies Allergen Reactions    Codeine GI Intolerance     Reaction Date: [id number];     Vicodin [Hydrocodone-Acetaminophen]            Labs:    0  Lab Value Date/Time   HCT 36 9 11/13/2018 0542   HCT 40 5 11/12/2018 1909   HCT 41 0 06/12/2018 1025   HCT 38 1 08/07/2015 1127   HCT 37 3 08/02/2015 0458   HCT 37 7 08/01/2015 0543   HGB 11 9 11/13/2018 0542   HGB 13 0 11/12/2018 1909   HGB 12 8 06/12/2018 1025   HGB 12 1 08/07/2015 1127   HGB 11 7 08/02/2015 0458   HGB 11 7 08/01/2015 0543   INR 1 05 07/31/2015 1606   WBC 6 23 11/13/2018 0542   WBC 9 16 11/12/2018 1909   WBC 6 47 06/12/2018 1025   WBC 6 05 08/07/2015 1127   WBC 5 76 08/02/2015 0458   WBC 6 36 08/01/2015 0543       Meds:    Current Facility-Administered Medications:     acetaminophen (TYLENOL) tablet 650 mg, 650 mg, Oral, Q6H [facility], Tawana Devlin PA-C, 650 mg at 11/13/18 0514    amLODIPine (NORVASC) tablet 2 5 mg, 2 5 mg, Oral, Daily, Felecia Buchanan PA-C    cholecalciferol (VITAMIN D3) tablet 2,000 Units, 2,000 Units, Oral, Daily, Felecia Buchanan PA-C    heparin (porcine) subcutaneous injection 5,000 Units, 5,000 Units, Subcutaneous, Q8H [facility], 5,000 Units at 11/13/18 0514 **AND** Platelet count, , , Once, Felecia Buchanan PA-C    lidocaine (LIDODERM) 5 % patch 1 patch, 1 patch, Topical, Daily, Felecia Buchanan PA-C, 1 patch at 11/13/18 0007    methocarbamol (ROBAXIN) tablet 500 mg, 500 mg, Oral, Q6H PRN, Felecia Buchanan PA-C    metoprolol succinate (TOPROL-XL) 24 hr tablet 25 mg, 25 mg, Oral, Daily, Tawana Devlin PA-C    ondansetron (ZOFRAN) injection 4 mg, 4 mg, Intravenous, Q6H PRN, Felecia Buchanan PA-C    Blood Culture:   No results found for: BLOODCX    Wound Culture:   No results found for: WOUNDCULT    Ins and Outs:  I/O last 24 hours:   In: 500 [IV Piggyback:500]  Out: 150 [Urine:150]          Physical Exam:   /80 (BP Location: Left arm)   Pulse 88   Temp 98 °F (36 7 °C) (Oral)   Resp 18   Wt 52 6 kg (116 lb) SpO2 96%   BMI 21 92 kg/m²   Gen: Alert and oriented to person, place, time, patient appears emaciated  HEENT: EOMI, eyes clear, moist mucus membranes, hearing intact  Respiratory: Bilateral chest rise  No audible wheezing found  Cardiovascular: Regular Rate and Rhythm  Abdomen: soft nontender/nondistended  Musculoskeletal: BACK  · Skin intact, no open lesions, no ecchymosis, severe kyphosis noted  · Tenderness to palpation over Thoracic spine, particularly over T12 region  · 4/5 strength with hip flexion/extension/abduction, Knee Flexion/extension, ankle dorsi flexion, FHL bilateral lower extremities  4/5 strength ankle planter flexion on right, 3/5 ankle plantar flexion on left  · Sensation intact to light touch L1-S1 bilateral lower extremities  · negative Straight leg raise  · 2+ deep tendon reflexes noted at patella tendon, achilles tendon bilateral lower extremities  · No subjective report of saddle anesthesia  · No clonus, negative babinski bilaterally    Radiology:   I personally reviewed the films  CT  Thoracic spine shows chronic compression deformities of T8 and T10 vertebral bodies noted no retropulsion at either of these levels  There is a chronic compression fracture at T12 with near obliteration of vertebral body height, there is retropulsion at this level with moderate central canal stenosis    _*_*_*_*_*_*_*_*_*_*_*_*_*_*_*_*_*_*_*_*_*_*_*_*_*_*_*_*_*_*_*_*_*_*_*_*_*_*_*_*_*    Assessment:  80 y  o female complaining of  Thoracic back pain    Plan:   · Pain control per primary team  · TLSO brace for use when out of bed  · PT/OT  · No neurological signs or symptoms of acute cord compression currently, as such inpatient MRI should not be necessary unless her symptoms or signs change or worsen acutely   · If patient does have worsening neurologic signs or symptoms, consider neurosurgery consult and MRI, otherwise patient needs to follow up with Dr Olivia Aragon outpatient or neurosurgery due to degree of central canal stenosis   · Dispo: Ortho signing off      Rosemary Bermudez PA-C

## 2018-11-13 NOTE — PHYSICAL THERAPY NOTE
PHYSICAL THERAPY NOTE    Patient Name: Mónica Whitlock  [id number] Date: 11/13/2018  Spoke to [employer] from [place] re: TLSO ordered for pt who is 116 lbs and is from home alone per RN staff  Abril Lopez reports either she will be to fit pt later this pm, or Elida Wallace will see her sooner for fitting   Will follow for formal PT Daniel Hughes, PT

## 2018-11-14 PROBLEM — R55 VASOVAGAL EPISODE: Status: ACTIVE | Noted: 2018-11-14

## 2018-11-14 PROBLEM — E44.1 MILD PROTEIN-CALORIE MALNUTRITION (HCC): Status: ACTIVE | Noted: 2018-11-14

## 2018-11-14 LAB
ANION GAP SERPL CALCULATED.3IONS-SCNC: 7 MMOL/L (ref 4–13)
BUN SERPL-MCNC: 18 MG/DL (ref 5–25)
CALCIUM SERPL-MCNC: 9.1 MG/DL (ref 8.3–10.1)
CHLORIDE SERPL-SCNC: 107 MMOL/L (ref 100–108)
CO2 SERPL-SCNC: 29 MMOL/L (ref 21–32)
CREAT SERPL-MCNC: 0.97 MG/DL (ref 0.6–1.3)
ERYTHROCYTE [DISTWIDTH] IN BLOOD BY AUTOMATED COUNT: 12.9 % (ref 11.6–15.1)
GFR SERPL CREATININE-BSD FRML MDRD: 51 ML/MIN/1.73SQ M
GLUCOSE SERPL-MCNC: 101 MG/DL (ref 65–140)
HCT VFR BLD AUTO: 38.4 % (ref 34.8–46.1)
HGB BLD-MCNC: 12.2 G/DL (ref 11.5–15.4)
MCH RBC QN AUTO: 31.2 PG (ref 26.8–34.3)
MCHC RBC AUTO-ENTMCNC: 31.8 G/DL (ref 31.4–37.4)
MCV RBC AUTO: 98 FL (ref 82–98)
PLATELET # BLD AUTO: 166 THOUSANDS/UL (ref 149–390)
PMV BLD AUTO: 10.3 FL (ref 8.9–12.7)
POTASSIUM SERPL-SCNC: 3.5 MMOL/L (ref 3.5–5.3)
PROCALCITONIN SERPL-MCNC: 0.07 NG/ML
RBC # BLD AUTO: 3.91 MILLION/UL (ref 3.81–5.12)
SODIUM SERPL-SCNC: 143 MMOL/L (ref 136–145)
WBC # BLD AUTO: 5.98 THOUSAND/UL (ref 4.31–10.16)

## 2018-11-14 PROCEDURE — 99232 SBSQ HOSP IP/OBS MODERATE 35: CPT | Performed by: INTERNAL MEDICINE

## 2018-11-14 PROCEDURE — 80048 BASIC METABOLIC PNL TOTAL CA: CPT | Performed by: INTERNAL MEDICINE

## 2018-11-14 PROCEDURE — G8978 MOBILITY CURRENT STATUS: HCPCS

## 2018-11-14 PROCEDURE — 84145 PROCALCITONIN (PCT): CPT | Performed by: INTERNAL MEDICINE

## 2018-11-14 PROCEDURE — 97163 PT EVAL HIGH COMPLEX 45 MIN: CPT

## 2018-11-14 PROCEDURE — 85027 COMPLETE CBC AUTOMATED: CPT | Performed by: INTERNAL MEDICINE

## 2018-11-14 PROCEDURE — 97535 SELF CARE MNGMENT TRAINING: CPT

## 2018-11-14 PROCEDURE — G8979 MOBILITY GOAL STATUS: HCPCS

## 2018-11-14 RX ORDER — BISACODYL 10 MG
10 SUPPOSITORY, RECTAL RECTAL DAILY
Status: DISCONTINUED | OUTPATIENT
Start: 2018-11-14 | End: 2018-11-16 | Stop reason: HOSPADM

## 2018-11-14 RX ORDER — SENNOSIDES 8.6 MG
1 TABLET ORAL
Status: DISCONTINUED | OUTPATIENT
Start: 2018-11-14 | End: 2018-11-16 | Stop reason: HOSPADM

## 2018-11-14 RX ORDER — POLYETHYLENE GLYCOL 3350 17 G/17G
17 POWDER, FOR SOLUTION ORAL DAILY
Status: DISCONTINUED | OUTPATIENT
Start: 2018-11-14 | End: 2018-11-16 | Stop reason: HOSPADM

## 2018-11-14 RX ADMIN — ACETAMINOPHEN 650 MG: 325 TABLET ORAL at 00:12

## 2018-11-14 RX ADMIN — METHOCARBAMOL TABLETS 500 MG: 500 TABLET, COATED ORAL at 06:01

## 2018-11-14 RX ADMIN — LIDOCAINE 1 PATCH: 50 PATCH CUTANEOUS at 22:51

## 2018-11-14 RX ADMIN — LIDOCAINE 1 PATCH: 50 PATCH CUTANEOUS at 00:12

## 2018-11-14 RX ADMIN — ACETAMINOPHEN 650 MG: 325 TABLET ORAL at 17:55

## 2018-11-14 RX ADMIN — METHOCARBAMOL TABLETS 500 MG: 500 TABLET, COATED ORAL at 13:57

## 2018-11-14 RX ADMIN — HEPARIN SODIUM 5000 UNITS: 5000 INJECTION, SOLUTION INTRAVENOUS; SUBCUTANEOUS at 22:45

## 2018-11-14 RX ADMIN — VITAMIN D, TAB 1000IU (100/BT) 2000 UNITS: 25 TAB at 09:03

## 2018-11-14 RX ADMIN — AMLODIPINE BESYLATE 2.5 MG: 2.5 TABLET ORAL at 09:03

## 2018-11-14 RX ADMIN — ACETAMINOPHEN 650 MG: 325 TABLET ORAL at 06:01

## 2018-11-14 RX ADMIN — HEPARIN SODIUM 5000 UNITS: 5000 INJECTION, SOLUTION INTRAVENOUS; SUBCUTANEOUS at 06:01

## 2018-11-14 RX ADMIN — ACETAMINOPHEN 650 MG: 325 TABLET ORAL at 11:56

## 2018-11-14 RX ADMIN — ACETAMINOPHEN 650 MG: 325 TABLET ORAL at 22:44

## 2018-11-14 RX ADMIN — HEPARIN SODIUM 5000 UNITS: 5000 INJECTION, SOLUTION INTRAVENOUS; SUBCUTANEOUS at 13:53

## 2018-11-14 RX ADMIN — SENNOSIDES 8.6 MG: 8.6 TABLET, FILM COATED ORAL at 22:45

## 2018-11-14 RX ADMIN — METOPROLOL SUCCINATE 25 MG: 25 TABLET, EXTENDED RELEASE ORAL at 09:03

## 2018-11-14 NOTE — SOCIAL WORK
CM spoke with patient this AM at bedside to verify interest in going to [facility] rehab  Patient is agreeable and understands that due to mobility she will need to go to SNF placement for a time in order to obtained more strength to return home  At this time, patient only has interest in the the referral sent to [facility]  CM responded in [place] about patient being interest in [facility] only  CM will review information in greater depth today in order to secure tentative d c tmw  CM reviewed flow sheet which indicated that patient has not had a bowel movement since admission  CM request something be provided by MD for lack of bowel movement  CM department will follow through discharge

## 2018-11-14 NOTE — PLAN OF CARE
Problem: PHYSICAL THERAPY ADULT  Goal: Performs mobility at highest level of function for planned discharge setting  See evaluation for individualized goals  Treatment/Interventions: Functional transfer training, LE strengthening/ROM, Elevations, Therapeutic exercise, Endurance training, Patient/family training, Equipment eval/education, Bed mobility, Gait training, Spoke to nursing  Equipment Recommended: Rachel Guido, Wheelchair       See flowsheet documentation for full assessment, interventions and recommendations  Prognosis: Fair  Problem List: Decreased strength, Decreased range of motion, Decreased endurance, Impaired balance, Decreased mobility, Decreased coordination, Pain (gait deviations)  Assessment: Pt is a 80 y o  female that presents to [address] [place] [address] on 11/12/2018 w/ Acute thoracic back pain  Patient fitted for TLSO yesterday by Claudia Alcantara  Prior to admission, pt lived in a 2 story home, 2 [address] w/ railings on both sides, alone where she was independent w/ mobility and some ADLs, using rolling walker in the home  Upon evaluation: Pt requires moderate assistance for bed mobility, transfers, and ambulation with rolling walker  Pt reported dizziness during ambulation trial, was seated in recliner chair, feet elevated, and had decreased  responsiveness for <1 min  Pt came to, vitals were taken and pt was positive for orthostatic hypotension  Pt's clinical presentation is currently unstable/unpredictable given the functional mobility deficits above and with medical complications of hypertension, hypotension during session w/ decreased responsiveness, bradycardia and risk of falls and acute pain  Pt will benefit from continued skilled physical therapy while in hospital and upon DC to address the deficits above and return to PLOF  From PT/mobility standpoint, recommendation at time of d/c would be inpatient rehab and with rolling walker pending progress to maximize pt's functional independence   Recommend trial with walker next 1-2 sessions and ther ex next 1-2 sessions  Further follow up by [facility] regarding decreased responsiveness/hypotension/bradycardia w/ ambulation trail  Barriers to Discharge: Decreased caregiver support, Inaccessible home environment  Barriers to Discharge Comments: SHANNAN, home alone  Recommendation: OT consult, Post acute IP rehab          See flowsheet documentation for full assessment

## 2018-11-14 NOTE — PROGRESS NOTES
Progress Note - Eliana Hickman 11/20/1925, 80 y o  female MRN: [id number]    Unit/Bed#: -01 Encounter: [id number]    Primary Care Provider: Doug Bermudez MD   Date and time admitted to hospital: 11/12/2018  6:08 PM        * Acute thoracic back pain   Assessment & Plan    · History of falls  · CT shows "Chronic T12 compression fracture with significant interval loss of height and fracture fragment retropulsion resulting in 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"  · Mild, chronic T8 and T10 compression fractures  · UA unremarkable, no leukocytosis, afebrile  · Ortho consulted  · Seen by Neurosurgery-no surgical intervention recommended  · PT/OT eval   · Check postvoid residuals  · Pain control, muscle relaxers prn      Essential hypertension   Assessment & Plan    · Stable, continue amlodipine     Mild protein-calorie malnutrition (HCC)   Assessment & Plan    Malnutrition Findings:   Malnutrition type: Unknown (comment) (elderly)  Degree of Malnutrition: Malnutrition of mild degree (identified in problem list, in agreement)    BMI Findings: Body mass index is 21 92 kg/m²  Chronic kidney disease, stage 3 (HCC)   Assessment & Plan    · Renal function at baseline, will monitor BMP     Vasovagal episode   Assessment & Plan    Patient had deep showed of dizziness and decreased responsiveness while working with PT, noted to have positive orthostatic blood pressures and heart rate of 50  Likely vasovagal event versus orthostatic hypotension    Continue to monitor  Encourage p o   Fluids  Recheck orthostatics tomorrow     Abnormal chest CT   Assessment & Plan    · Thoracic CT shows "Airspace and endobronchial opacities throughout both lungs, most prominent at the lung bases suggesting acute and chronic aspiration pneumonia"  · In the setting of bronchiectasis  · CBC without leukocytosis, afebrile  · No hypoxia, no tachycardia/tachypnea  ·  Procalcitonin normal       VTE Pharmacologic Prophylaxis: Pharmacologic: Heparin  Mechanical VTE Prophylaxis in Place: Yes    Patient Centered Rounds: I have performed bedside rounds with nursing staff today  Discussions with Specialists or Other Care Team Provider:  Nursing    Education and Discussions with Family / Patient:  Patient    Time Spent for Care: 20 minutes  More than 50% of total time spent on counseling and coordination of care as described above  Current Length of Stay: 2 day(s)    Current Patient Status: Inpatient   Certification Statement: The patient will continue to require additional inpatient hospital stay due to Back pain    Discharge Plan:  Next 24 hours    Code Status: Level 1 - Full Code      Subjective:   Offers no complaints  No complains of  pain    Objective:     Vitals:   Temp (24hrs), Av 1 °F (36 7 °C), Min:98 °F (36 7 °C), Max:98 2 °F (36 8 °C)    Temp:  [98 °F (36 7 °C)-98 2 °F (36 8 °C)] 98 2 °F (36 8 °C)  HR:  [55-80] 71  Resp:  [16-18] 18  BP: ()/(54-83) 122/60  SpO2:  [94 %-96 %] 96 %  Body mass index is 21 92 kg/m²  Input and Output Summary (last 24 hours): Intake/Output Summary (Last 24 hours) at 18 1439  Last data filed at 18 1333   Gross per 24 hour   Intake                0 ml   Output              148 ml   Net             -148 ml       Physical Exam:     Physical Exam     Gen -Patient comfortable at rest  Neck- Supple  No thyromegaly or lymphadenopathy  Lungs-Clear bilaterally without any wheeze or rales   Heart S1-S2, regular rate and rhythm, no murmurs  Abdomen-soft nontender, no organomegaly   Bowel sounds present  Extremities-no cyanosi,  clubbing or edema  Skin- no rash  Neuro-nonfocal    Additional Data:     Labs:      Results from last 7 days  Lab Units 18  0554 18  0542   WBC Thousand/uL 5 98 6 23   HEMOGLOBIN g/dL 12 2 11 9   HEMATOCRIT % 38 4 36 9   PLATELETS Thousands/uL 166 167   NEUTROS PCT %  --  69   LYMPHS PCT %  --  16   MONOS PCT %  --  10   EOS PCT %  --  3 Results from last 7 days  Lab Units 11/14/18  0554  11/12/18  1909   POTASSIUM mmol/L 3 5  < > 4 1   CHLORIDE mmol/L 107  < > 104   CO2 mmol/L 29  < > 28   BUN mg/dL 18  < > 23   CREATININE mg/dL 0 97  < > 1 13   ANION GAP mmol/L 7  < > 10   CALCIUM mg/dL 9 1  < > 9 3   ALBUMIN g/dL  --   --  3 8   TOTAL BILIRUBIN mg/dL  --   --  0 50   ALK PHOS U/L  --   --  66   ALT U/L  --   --  26   AST U/L  --   --  24   < > = values in this interval not displayed  Results from last 7 days  Lab Units 11/14/18  0554 11/13/18  0542   PROCALCITONIN ng/ml 0 07 <0 05           * I Have Reviewed All Lab Data Listed Above  * Additional Pertinent Lab Tests Reviewed: All Labs Within Last 24 Hours Reviewed    Imaging:    Imaging Reports Reviewed Today Include:   Imaging Personally Reviewed by Myself Includes:      Recent Cultures (last 7 days):           Last 24 Hours Medication List:     Current Facility-Administered Medications:  acetaminophen 650 mg Oral Q6H [address] Tawana Devlin PA-C   amLODIPine 2 5 mg Oral Daily Marilee Leung PA-C   bisacodyl 10 mg Rectal Daily Rafaela Yang MD   cholecalciferol 2,000 Units Oral Daily Marilee Leung PA-C   heparin (porcine) 5,000 Units Subcutaneous Q8H [address] Tawana Krause PA-C   lidocaine 1 patch Topical Daily Tawana Krause PA-C   methocarbamol 500 mg Oral Q6H PRN Marilee Leung PA-C   metoprolol succinate 25 mg Oral Daily Tawana Devlin PA-C   ondansetron 4 mg Intravenous Q6H PRN Tawana Devlin PA-C   polyethylene glycol 17 g Oral Daily Rafaela Yang MD   senna 1 tablet Oral HS Rafaela Yang MD        Today, Patient Was Seen By: Velvet Poole MD    ** Please Note: Dictation voice to text software may have been used in the creation of this document   **

## 2018-11-14 NOTE — ASSESSMENT & PLAN NOTE
· Thoracic CT shows "Airspace and endobronchial opacities throughout both lungs, most prominent at the lung bases suggesting acute and chronic aspiration pneumonia"  · In the setting of bronchiectasis  · CBC without leukocytosis, afebrile  · No hypoxia, no tachycardia/tachypnea  ·  Procalcitonin normal

## 2018-11-14 NOTE — PLAN OF CARE
Problem: PHYSICAL THERAPY ADULT  Goal: Performs mobility at highest level of function for planned discharge setting  See evaluation for individualized goals  Treatment/Interventions: Functional transfer training, LE strengthening/ROM, Elevations, Therapeutic exercise, Endurance training, Patient/family training, Equipment eval/education, Bed mobility, Gait training, Spoke to nursing  Equipment Recommended: Eileen Johansen, Wheelchair       See flowsheet documentation for full assessment, interventions and recommendations  Outcome: Progressing  Prognosis: Fair  Problem List: Decreased strength, Decreased range of motion, Decreased endurance, Impaired balance, Decreased mobility, Decreased coordination, Pain (gait deviations)  Assessment: Pt is a 80 y o  female that presents to [address] [place] [address] on 11/12/2018 w/ Acute thoracic back pain  Patient fitted for TLSO yesterday by Nidhi Novoa  Prior to admission, pt lived in a 2 story home, 2 [address] w/ railings on both sides, alone where she was independent w/ mobility and some ADLs, using rolling walker in the home  Upon evaluation: Pt requires moderate assistance for bed mobility, transfers, and ambulation with rolling walker  Pt reported dizziness during ambulation trial, was seated in recliner chair, feet elevated, and had decreased  responsiveness for <1 min  Pt came to, vitals were taken and pt was positive for orthostatic hypotension  Pt's clinical presentation is currently unstable/unpredictable given the functional mobility deficits above and with medical complications of hypertension, hypotension during session w/ decreased responsiveness, bradycardia and risk of falls and acute pain  Pt will benefit from continued skilled physical therapy while in hospital and upon DC to address the deficits above and return to PLOF   From PT/mobility standpoint, recommendation at time of d/c would be inpatient rehab and with rolling walker pending progress to maximize pt's functional independence  Recommend trial with walker next 1-2 sessions and ther ex next 1-2 sessions  Further follow up by [facility] regarding decreased responsiveness/hypotension/bradycardia w/ ambulation trail  Barriers to Discharge: Decreased caregiver support, Inaccessible home environment  Barriers to Discharge Comments: SHANNAN, home alone  Recommendation: OT consult, Post acute IP rehab          See flowsheet documentation for full assessment

## 2018-11-14 NOTE — PLAN OF CARE
Problem: DISCHARGE PLANNING - CARE MANAGEMENT  Goal: Discharge to post-acute care or home with appropriate resources  INTERVENTIONS:  - Conduct assessment to determine patient/family and health care team treatment goals, and need for post-acute services based on payer coverage, community resources, and patient preferences, and barriers to discharge  - Address psychosocial, clinical, and financial barriers to discharge as identified in assessment in conjunction with the patient/family and health care team  - Arrange appropriate level of post-acute services according to patients   needs and preference and payer coverage in collaboration with the physician and health care team  - Communicate with and update the patient/family, physician, and health care team regarding progress on the discharge plan  - Arrange appropriate transportation to post-acute venues  Outcome: Progressing  CM spoke with patient this AM at bedside to verify interest in going to [facility] re[facility]  Patient is agreeable and understands that due to mobility she will need to go to SNF placement for a time in order to obtained more strength to return home  At this time, patient only has interest in the the referral sent to [facility]  CM responded in [facility] about patient being interest in [facility] only  CM will review information in greater depth today in order to secure tentative d c [facility]  CM reviewed flow sheet which indicated that patient has not had a bowel movement since admission  CM request something be provided by MD for lack of bowel movement  CM department will follow through discharge

## 2018-11-14 NOTE — ASSESSMENT & PLAN NOTE
Patient had deep showed of dizziness and decreased responsiveness while working with PT, noted to have positive orthostatic blood pressures and heart rate of 50  Likely vasovagal event versus orthostatic hypotension    Continue to monitor  Encourage p o   Fluids  Recheck orthostatics tomorrow

## 2018-11-14 NOTE — PHYSICAL THERAPY NOTE
PHYSICAL THERAPY EVALUATION  NAME:  Lauri Darnell  DATE: 11/14/18    AGE:   80 y o  Mrn:   [id number]  ADMIT DX:  Lower back pain [M54 5]  Gait instability [R26 81]  Compression fx, thoracic spine ([address] ) [S22 000A]  T12 compression fracture ([address] ) [S22 080A]  Intractable pain [R52]  Central stenosis of spinal canal [M48 00]    Past Medical History:   Diagnosis Date    Compression fracture     Aug 2015    COPD (chronic obstructive pulmonary disease) (HCC)     Hematuria     Hypertension     Osteopenia     Renal disorder      Length Of Stay: 2  Performed at least 2 patient identifiers during session: Name, Lety Buckner and ANNABEL jeong    PHYSICAL THERAPY EVALUATION :    11/14/18 1203   Note Type   Note type Eval/Treat   Pain Assessment   Pain Assessment No/denies pain   Pain Score No Pain   Pain Location Back   Pain Orientation Mid;Lower   Clinical Progression Gradually improving   Effect of Pain on Daily Activities impaired mobility   Patient's Stated Pain Goal No pain   Hospital Pain Intervention(s) Repositioned; Ambulation/increased activity   Response to Interventions increased pain w/ mobility, notified nursing    Home Living   Type of [facility] Two level; Able to live on main level with bedroom/bathroom;Stairs to enter with rails  (2 SHANNAN, rails on B sides)   Home Equipment Walker   Prior Function   Level of [place] Needs assistance with IADLs   Lives With Alone   Receives Help From Family  (Niece)   ADL Assistance Needs assistance   IADLs Needs assistance   Falls in the last 6 months 0  (stated she has had many falls since turning 80)   Vocational Retired   Restrictions/Precautions   [facility] Bearing Precautions Per Order No   Braces or Orthoses TLSO   Other Precautions Chair Alarm; Bed Alarm; Fall Risk;Pain   General   Additional Pertinent History CT shows "Chronic T12 compression fracture with significant interval loss of height and fracture fragment retropulsion resulting in 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" Mild, chronic T8 and T10 compression fractures  TLSO brace  Neurosurgery consult impression: conservative management   Family/Caregiver Present No   Cognition   Overall Cognitive Status WFL   Arousal/Participation Cooperative   Orientation Level Oriented X4   Memory Within functional limits   Following Commands Follows one step commands with increased time or repetition   RUE Assessment   RUE Assessment WFL   LUE Assessment   LUE Assessment WFL   Strength RLE   R Hip Flexion 3/5   R Knee Extension 3+/5   R Ankle Dorsiflexion 4-/5   R Ankle Plantar Flexion 4-/5   Strength LLE   L Hip Flexion 3/5   L Knee Extension 3+/5   L Ankle Dorsiflexion 4-/5   L Ankle Plantar Flexion 4-/5   Coordination   Movements are Fluid and Coordinated 0   Coordination and Movement Description Bradykinesia   Sensation WFL  (Vision and hearing intact)   Light Touch   RLE Light Touch Grossly intact   LLE Light Touch Grossly intact   Transfers   Sit to Stand 3  Moderate assistance   Additional items Assist x 1; Armrests; Increased time required;Verbal cues  (instruction for hand placement, push through legs)   Stand to Sit 3  Moderate assistance   Additional items Assist x 1; Armrests; Increased time required;Verbal cues  (Instruction for hand placement)   Ambulation/Elevation   Gait pattern Excessively slow; Step to;Short stride; Foward flexed; Shuffling; Wide ALEN; Antalgic   Gait Assistance 3  Moderate assist   Additional items Assist x 1;Verbal cues  (Instruction for walker management, posture)   Assistive Device Rolling walker   Distance 6' trial ending due to pt reports of dizziness, was seated in recliner, after 1 min pt had decreased responsiveness, nursing notified, vitals taken immediately after:BP: 95/54, HR: 57, SpO2: 94%, 3 min post: BP: 122/60, HR: 68, SpO2: 95%   Stair Management Assistance Not tested   Balance   Static Sitting Fair   Static Standing Poor +   Ambulatory Poor   Endurance Deficit   Endurance Deficit Yes   Endurance Deficit Description decreaed ambulatory distance   Activity Tolerance   Activity Tolerance Patient limited by pain; Patient limited by fatigue   Nurse Made Aware Spoke to [facility] RN   Assessment   Prognosis Fair   Problem List Decreased strength;Decreased range of motion;Decreased endurance; Impaired balance;Decreased mobility; Decreased coordination;Pain  (gait deviations)   Assessment Pt is a 80 y o  female that presents to [address] [place] [address] on 11/12/2018 w/ Acute thoracic back pain  Patient fitted for TLSO yesterday by Amina Corral  Prior to admission, pt lived in a 2 story home, 2 SHANNAN w/ railings on both sides, alone where she was independent w/ mobility and some ADLs, using rolling walker in the home  Upon evaluation: Pt requires moderate assistance for bed mobility, transfers, and ambulation with rolling walker  Pt reported dizziness during ambulation trial, was seated in recliner chair, feet elevated, and had decreased  responsiveness for <1 min  Pt came to, vitals were taken and pt was positive for orthostatic hypotension  Pt's clinical presentation is currently unstable/unpredictable given the functional mobility deficits above and with medical complications of hypertension, hypotension during session w/ decreased responsiveness, bradycardia and risk of falls and acute pain  Pt will benefit from continued skilled physical therapy while in hospital and upon DC to address the deficits above and return to PLOF  From PT/mobility standpoint, recommendation at time of d/c would be inpatient rehab and with rolling walker pending progress to maximize pt's functional independence  Recommend trial with walker next 1-2 sessions and ther ex next 1-2 sessions  Further follow up by [facility] regarding decreased responsiveness/hypotension/bradycardia w/ ambulation trail  Barriers to Discharge Decreased caregiver support; Inaccessible home environment   Barriers to Discharge Comments SHANNAN, home alone Goals   Patient Goals to go home eventually, less pain   STG Expiration Date 11/24/18   Short Term Goal #1 Pt will: Perform bed mobility tasks to Supervision to decrease burden of care, decrease risk for falls and increase Indep in prior living environment  Perform transfers to Supervision to decrease risk for falls and increase Indep in prior living environment  Perform ambulation with rolling walker for 50' to  Supervision  to increase Indep in home environment, decrease risk for falls, improve activity tolerance and ambulate household distances  Perform 2 steps w/ supervision using rails to perform SHANNAN upon return to home  Pt will be able to don/doff brace w/ <10% A and verbal instruction  Pt will be able to verbalize when to where the brace 100% of the time  Treatment Day 1   Plan   Treatment/Interventions Functional transfer training;LE strengthening/ROM; Elevations; Therapeutic exercise; Endurance training;Patient/family training;Equipment eval/education; Bed mobility;Gait training;Spoke to nursing   PT Frequency 5x/wk; Weekend   Recommendation   Recommendation OT consult; Post acute IP rehab   Equipment Recommended Walker; Wheelchair   Additional Comments SHANNAN, decreased caregiver support   Barthel Index   Feeding 5   Bathing 0   Grooming Score 0   Dressing Score 5   Bladder Score 0   Bowels Score 10   Toilet Use Score 5   Transfers (Bed/Chair) Score 5   Mobility (Level Surface) Score 0   Stairs Score 0   Barthel Index Score 30             PHYSICAL THERAPY TREATMENT NOTE  Time in: 1150  Time out: 1203  Total time: 13 min    S: Pt was agreeable to continue w/ session  Pain at 4/10  O: Performed transfer from recliner to EOB to supine w/ min A  Pt required increased time required, verbal instruction for hand placement, and physical A for LE management   Provided education on doffing TLSO brace and provided visual of compression fx to explain why she may be having pain, treatment protocol for compression fx per ortho   A: Pt stated that she would have difficulty donning and doffing TLSO brace by herself  Pt verbalized some understanding of why she has increased pain after visual was provided  Although pt did not increase activity tolerance, she required less A for transfers  P: Continue POC  Perform bed mobility tasks to Supervision to decrease burden of care, decrease risk for falls and increase Indep in prior living environment  Perform transfers to Supervision to decrease risk for falls and increase Indep in prior living environment  Perform ambulation with rolling walker for 50' to  Supervision  to increase Indep in home environment, decrease risk for falls, improve activity tolerance and ambulate household distances  Perform 2 steps w/ supervision using rails to perform SHANNAN upon return to home  Pt will be able to don/doff brace w/ <10% A and verbal instruction  Pt will be able to verbalize when to where the brace 100% of the time         Ernie Martin, SPT

## 2018-11-14 NOTE — ASSESSMENT & PLAN NOTE
· History of falls  · CT shows "Chronic T12 compression fracture with significant interval loss of height and fracture fragment retropulsion resulting in 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"  · Mild, chronic T8 and T10 compression fractures    · UA unremarkable, no leukocytosis, afebrile  · Ortho consulted  · Seen by Neurosurgery-no surgical intervention recommended  · PT/OT eval   · Check postvoid residuals  · Pain control, muscle relaxers prn

## 2018-11-14 NOTE — ASSESSMENT & PLAN NOTE
Malnutrition Findings:   Malnutrition type: Unknown (comment) (elderly)  Degree of Malnutrition: Malnutrition of mild degree (identified in problem list, in agreement)    BMI Findings: Body mass index is 21 92 kg/m²

## 2018-11-15 LAB
ATRIAL RATE: 73 BPM
GLUCOSE SERPL-MCNC: 109 MG/DL (ref 65–140)
P AXIS: 100 DEGREES
PR INTERVAL: 164 MS
QRS AXIS: 58 DEGREES
QRSD INTERVAL: 76 MS
QT INTERVAL: 400 MS
QTC INTERVAL: 440 MS
T WAVE AXIS: 44 DEGREES
VENTRICULAR RATE: 73 BPM

## 2018-11-15 PROCEDURE — 93010 ELECTROCARDIOGRAM REPORT: CPT | Performed by: INTERNAL MEDICINE

## 2018-11-15 PROCEDURE — 97530 THERAPEUTIC ACTIVITIES: CPT

## 2018-11-15 PROCEDURE — 97116 GAIT TRAINING THERAPY: CPT

## 2018-11-15 PROCEDURE — 93005 ELECTROCARDIOGRAM TRACING: CPT

## 2018-11-15 PROCEDURE — 82948 REAGENT STRIP/BLOOD GLUCOSE: CPT

## 2018-11-15 PROCEDURE — 99232 SBSQ HOSP IP/OBS MODERATE 35: CPT | Performed by: INTERNAL MEDICINE

## 2018-11-15 PROCEDURE — 97535 SELF CARE MNGMENT TRAINING: CPT

## 2018-11-15 RX ORDER — SODIUM CHLORIDE 9 MG/ML
75 INJECTION, SOLUTION INTRAVENOUS CONTINUOUS
Status: DISCONTINUED | OUTPATIENT
Start: 2018-11-15 | End: 2018-11-16

## 2018-11-15 RX ORDER — OXYCODONE HYDROCHLORIDE 5 MG/1
2.5 TABLET ORAL EVERY 4 HOURS PRN
Status: DISCONTINUED | OUTPATIENT
Start: 2018-11-15 | End: 2018-11-16 | Stop reason: HOSPADM

## 2018-11-15 RX ADMIN — LIDOCAINE 1 PATCH: 50 PATCH CUTANEOUS at 23:43

## 2018-11-15 RX ADMIN — SODIUM CHLORIDE 500 ML: 0.9 INJECTION, SOLUTION INTRAVENOUS at 14:59

## 2018-11-15 RX ADMIN — HEPARIN SODIUM 5000 UNITS: 5000 INJECTION, SOLUTION INTRAVENOUS; SUBCUTANEOUS at 13:52

## 2018-11-15 RX ADMIN — OXYCODONE HYDROCHLORIDE 2.5 MG: 5 TABLET ORAL at 10:55

## 2018-11-15 RX ADMIN — METOPROLOL SUCCINATE 25 MG: 25 TABLET, EXTENDED RELEASE ORAL at 08:57

## 2018-11-15 RX ADMIN — POLYETHYLENE GLYCOL 3350 17 G: 17 POWDER, FOR SOLUTION ORAL at 08:57

## 2018-11-15 RX ADMIN — VITAMIN D, TAB 1000IU (100/BT) 2000 UNITS: 25 TAB at 08:57

## 2018-11-15 RX ADMIN — HEPARIN SODIUM 5000 UNITS: 5000 INJECTION, SOLUTION INTRAVENOUS; SUBCUTANEOUS at 06:01

## 2018-11-15 RX ADMIN — SENNOSIDES 8.6 MG: 8.6 TABLET, FILM COATED ORAL at 21:15

## 2018-11-15 RX ADMIN — SODIUM CHLORIDE 75 ML/HR: 0.9 INJECTION, SOLUTION INTRAVENOUS at 19:09

## 2018-11-15 RX ADMIN — ACETAMINOPHEN 650 MG: 325 TABLET ORAL at 12:03

## 2018-11-15 RX ADMIN — SODIUM CHLORIDE 75 ML/HR: 0.9 INJECTION, SOLUTION INTRAVENOUS at 14:59

## 2018-11-15 RX ADMIN — OXYCODONE HYDROCHLORIDE 2.5 MG: 5 TABLET ORAL at 21:18

## 2018-11-15 RX ADMIN — METHOCARBAMOL TABLETS 500 MG: 500 TABLET, COATED ORAL at 08:57

## 2018-11-15 RX ADMIN — ACETAMINOPHEN 650 MG: 325 TABLET ORAL at 23:43

## 2018-11-15 RX ADMIN — ACETAMINOPHEN 650 MG: 325 TABLET ORAL at 06:01

## 2018-11-15 RX ADMIN — ACETAMINOPHEN 650 MG: 325 TABLET ORAL at 17:00

## 2018-11-15 RX ADMIN — HEPARIN SODIUM 5000 UNITS: 5000 INJECTION, SOLUTION INTRAVENOUS; SUBCUTANEOUS at 21:15

## 2018-11-15 RX ADMIN — AMLODIPINE BESYLATE 2.5 MG: 2.5 TABLET ORAL at 08:57

## 2018-11-15 NOTE — PLAN OF CARE
Problem: DISCHARGE PLANNING - CARE MANAGEMENT  Goal: Discharge to post-acute care or home with appropriate resources  INTERVENTIONS:  - Conduct assessment to determine patient/family and health care team treatment goals, and need for post-acute services based on payer coverage, community resources, and patient preferences, and barriers to discharge  - Address psychosocial, clinical, and financial barriers to discharge as identified in assessment in conjunction with the patient/family and health care team  - Arrange appropriate level of post-acute services according to patients   needs and preference and payer coverage in collaboration with the physician and health care team  - Communicate with and update the patient/family, physician, and health care team regarding progress on the discharge plan  - Arrange appropriate transportation to post-acute venues   Outcome: Progressing  CM called [facility] and obtained transport from [facility] to [facility] via S  CM request 1pm transport time and Roger Coffey will make contact regarding who will transport  CM will follow through discharge

## 2018-11-15 NOTE — PROGRESS NOTES
Progress Note - Lauri Darnell 11/20/1925, 80 y o  female MRN: [id number]    Unit/Bed#: -01 Encounter: [id number]    Primary Care Provider: Jacque Stein MD   Date and time admitted to hospital: 11/12/2018  6:08 PM        Syncope   Assessment & Plan    Patient again had another episode of syncope today while working with PT  Apparently felt dizzy and passed out for few seconds  Rapid response was called, patient was assessed and blood pressure noted to be 145/80  Heart rate was 72  Patient complained of back pain  EKG obtained showed normal sinus rhythm     IV normal saline bolus 500 cc x1, then 75 cc/hour  Tele monitoring for 24 hours  Pain control  Updated geneva Alethea Sofya     * Acute thoracic back pain   Assessment & Plan    · History of falls  · CT shows "Chronic T12 compression fracture with significant interval loss of height and fracture fragment retropulsion resulting in 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"  · Mild, chronic T8 and T10 compression fractures  · UA unremarkable, no leukocytosis, afebrile  · Ortho consulted  · Seen by Neurosurgery-no surgical intervention recommended  · PT/OT eval   · Check postvoid residuals  · Pain control, muscle relaxers prn      Mild protein-calorie malnutrition (HCC)   Assessment & Plan    Malnutrition Findings:   Malnutrition type: Unknown (comment) (elderly)  Degree of Malnutrition: Malnutrition of mild degree (identified in problem list, in agreement)    BMI Findings: Body mass index is 21 92 kg/m²          Chronic kidney disease, stage 3 (HCC)   Assessment & Plan    · Renal function at baseline, will monitor BMP     Essential hypertension   Assessment & Plan    · Stable, continue amlodipine     Abnormal chest CT   Assessment & Plan    · Thoracic CT shows "Airspace and endobronchial opacities throughout both lungs, most prominent at the lung bases suggesting acute and chronic aspiration pneumonia"  · In the setting of bronchiectasis  · CBC without leukocytosis, afebrile  · No hypoxia, no tachycardia/tachypnea  ·  Procalcitonin normal       VTE Pharmacologic Prophylaxis:   Pharmacologic: Heparin  Mechanical VTE Prophylaxis in Place: Yes    Patient Centered Rounds: I have performed bedside rounds with nursing staff today  Discussions with Specialists or Other Care Team Provider:  Nursing    Education and Discussions with Family / Patient:  Patient    Time Spent for Care: 20 minutes  More than 50% of total time spent on counseling and coordination of care as described above  Current Length of Stay: 3 day(s)    Current Patient Status: Inpatient   Certification Statement: The patient will continue to require additional inpatient hospital stay due to Syncopal episode    Discharge Plan:  Pending improvement    Code Status: Level 1 - Full Code      Subjective:   Patient had syncopal episode while working with PT      Objective:     Vitals:   Temp (24hrs), Av 7 °F (36 5 °C), Min:97 5 °F (36 4 °C), Max:98 °F (36 7 °C)    Temp:  [97 5 °F (36 4 °C)-98 °F (36 7 °C)] 97 6 °F (36 4 °C)  HR:  [72-88] 74  Resp:  [16-18] 18  BP: (144-212)/(65-93) 164/72  SpO2:  [94 %-99 %] 99 %  Body mass index is 21 92 kg/m²  Input and Output Summary (last 24 hours): Intake/Output Summary (Last 24 hours) at 11/15/18 1607  Last data filed at 11/15/18 1538   Gross per 24 hour   Intake              120 ml   Output              355 ml   Net             -235 ml       Physical Exam:     Physical Exam     Gen -Patient comfortable at rest  Neck- Supple  No thyromegaly or lymphadenopathy  Lungs-Clear bilaterally without any wheeze or rales   Heart S1-S2, regular rate and rhythm, no murmurs  Abdomen-soft nontender, no organomegaly   Bowel sounds present  Extremities-no cyanosi,  clubbing or edema  Skin- no rash  Neuro:  Awake alert and oriented, no gross deficits         Additional Data:     Labs:      Results from last 7 days  Lab Units 18  0554 18  0542   WBC Thousand/uL 5 98 6 23   HEMOGLOBIN g/dL 12 2 11 9   HEMATOCRIT % 38 4 36 9   PLATELETS Thousands/uL 166 167   NEUTROS PCT %  --  69   LYMPHS PCT %  --  16   MONOS PCT %  --  10   EOS PCT %  --  3       Results from last 7 days  Lab Units 11/14/18  0554  11/12/18  1909   POTASSIUM mmol/L 3 5  < > 4 1   CHLORIDE mmol/L 107  < > 104   CO2 mmol/L 29  < > 28   BUN mg/dL 18  < > 23   CREATININE mg/dL 0 97  < > 1 13   ANION GAP mmol/L 7  < > 10   CALCIUM mg/dL 9 1  < > 9 3   ALBUMIN g/dL  --   --  3 8   TOTAL BILIRUBIN mg/dL  --   --  0 50   ALK PHOS U/L  --   --  66   ALT U/L  --   --  26   AST U/L  --   --  24   < > = values in this interval not displayed  Results from last 7 days  Lab Units 11/14/18  0554 11/13/18  0542   PROCALCITONIN ng/ml 0 07 <0 05           * I Have Reviewed All Lab Data Listed Above  * Additional Pertinent Lab Tests Reviewed:  All Labs Within Last 24 Hours Reviewed    Imaging:    Imaging Reports Reviewed Today Include:   Imaging Personally Reviewed by Myself Includes:      Recent Cultures (last 7 days):           Last 24 Hours Medication List:     Current Facility-Administered Medications:  acetaminophen 650 mg Oral Q6H [address] Tawana Devlin PA-C    amLODIPine 2 5 mg Oral Daily Jose FullIVAN bush    bisacodyl 10 mg Rectal Daily Rafaela Yang MD    cholecalciferol 2,000 Units Oral Daily Tawana Devlin PA-C    heparin (porcine) 5,000 Units Subcutaneous Q8H [address] Tawana Payan PA-C    lidocaine 1 patch Topical Daily Tawana Devlin PA-C    methocarbamol 500 mg Oral Q6H PRN Jose FullIVAN bush    metoprolol succinate 25 mg Oral Daily Tawana Devlin PA-C    ondansetron 4 mg Intravenous Q6H PRN Jose FullIVAN bush    oxyCODONE 2 5 mg Oral Q4H PRN Rafaela Yang MD    polyethylene glycol 17 g Oral Daily Rafaela Yang MD    senna 1 tablet Oral HS Rafaela Yang MD    sodium chloride 500 mL Intravenous Once Nan MD Zain Last Rate: 500 mL (11/15/18 1459)   sodium chloride 75 mL/hr Intravenous Continuous Rafaela Yang MD Last Rate: 75 mL/hr (11/15/18 1459)        Today, Patient Was Seen By: Velvet Poole MD    ** Please Note: Dictation voice to text software may have been used in the creation of this document   **

## 2018-11-15 NOTE — PROGRESS NOTES
Rapid called  Patient had syncopal episode during PT did not fall on floor was in chair  Patient came to in about 1 minute  EKG and vitals stable  3L o2 put on for comfort  Telemetry ordered and bolus/fluids ordered  Patient transferred to bed  Feeling ok but has pain in her back from fracture  Will continue to monitor

## 2018-11-15 NOTE — PHYSICAL THERAPY NOTE
PHYSICAL THERAPY TREATMENT NOTE  NAME:  Sallie Brower  DATE: 11/15/18    Length Of Stay: 3  Performed at least 2 patient identifiers during session: Name and Birthday  TREATMENT:      11/15/18 1500   Pain Assessment   Pain Assessment 0-10   Pain Score 7   Pain Type Acute pain;Chronic pain   Pain Location Back   Pain Orientation Mid;Lower   Effect of Pain on Daily Activities limits speed and indep of mobility   Patient's Stated Pain Goal No pain   Hospital Pain Intervention(s) Repositioned; Ambulation/increased activity; Medication (See MAR); Emotional support  (TLSO brace)   Restrictions/Precautions   Weight Bearing Precautions Per Order No   Braces or Orthoses TLSO  (horizon brace, dependent to don)   Other Precautions Bed Alarm; Chair Alarm;Pain; Fall Risk;O2   General   Chart Reviewed Yes   Response to Previous Treatment Patient with no complaints from previous session  Family/Caregiver Present No   Cognition   Overall Cognitive Status WFL   Arousal/Participation Lethargic   Attention Difficulty attending to directions   Orientation Level Oriented X4   Memory Within functional limits   Following Commands Follows one step commands with increased time or repetition   Subjective   Subjective Family (including Abigail Sin) present who reports confusion along with other family members re: purpose of TLSO brace  Pt and family instructed on general compression fx information, TLSO--20 min total   Bed Mobility   Sit to Supine 1  Dependent   Additional items Assist x 2; Increased time required;Verbal cues; Bedrails;HOB elevated   Transfers   Sit to Stand 3  Moderate assistance   Additional items Assist x 1; Increased time required;Verbal cues; Bedrails;HOB elevated   Stand to Sit 3  Moderate assistance   Additional items Assist x 1; Increased time required;Verbal cues;Armrests; Bedrails   Sit pivot 1  Dependent  (pad as sling, 2 trials to L side)   Additional items Assist x 1; Increased time required;Verbal cues  (with decreased responsiveness post 3rd gait trial)   Additional Comments Pt had decreased responsiveness after third gait trial, and unresponsiveness after dependent transfer from armchair to recliner chair  Pt did display agonal breathing and flexor withdrawal during that time with one arm  Rapid response called, information translated to RN, charge RN, Dr Efrain Russo from critical care  Once pt more awake after 15 min, pt dependently transferred back to bed via pivot using pad as sling, dependent A of 2 for sit->supine  Ambulation/Elevation   Gait pattern Foward flexed; Excessively slow; Step to; Inconsistent david   Gait Assistance 4  Minimal assist   Additional items Assist x 1;Verbal cues   Assistive Device Rolling walker   Distance unsuccessful on first attempt, pregait step forward-backward x2 reps second attempt, third trial 4' with pt reporting substantial lightheadedness  Balance   Static Sitting Fair -   Static Standing Poor +  (retropulsive; standing tolerance2 min first and second trial)   Ambulatory Poor   Endurance Deficit   Endurance Deficit Yes   Endurance Deficit Description limited standing tolerance and amb distance   Activity Tolerance   Activity Tolerance Patient limited by pain; Patient limited by fatigue   Nurse Made Aware spoke to [facility] prior to session; and multiple staff after decreased responsiveness/ unresponsiveness  @ end session   Exercises   Hip Flexion Sitting;10 reps;AAROM;AROM; Bilateral   Knee AROM Long Arc Quad Sitting;10 reps;AAROM;AROM; Bilateral   Assessment   Prognosis Fair   Problem List Decreased strength;Decreased range of motion;Decreased endurance; Impaired balance;Decreased mobility; Decreased coordination;Pain  (gait deviations)   Assessment Pt requires more physical A for mobility including transfers and gait  Pt did not amb as far this session, and continued to c/o lightheadedness which did resolve after first and second stand trials   Rapid response called during PT session after decreased responsiveness and unresponsiveness  Recommend continued trials as medical status allows for progression of mobility, including trials with rolling walker and TLSO with caution for lightheadedness  Also recommend further w/u from medical standpoint based on pt's s/s  Barriers to Discharge Decreased caregiver support; Inaccessible home environment   Barriers to Discharge Comments SHANNAN, home alone   Goals   Patient Goals to get back to what I was doing, less pain   STG Expiration Date 11/24/18   Treatment Day 2   Plan   Treatment/Interventions Functional transfer training;Elevations;LE strengthening/ROM; Therapeutic exercise; Endurance training;Cognitive reorientation;Patient/family training;Equipment eval/education; Bed mobility;Gait training;Spoke to nursing;Spoke to MD;Spoke to advanced practitioner   Progress No functional improvements   PT Frequency 5x/wk   Recommendation   Recommendation OT consult; Rehab consult; Post acute IP rehab; Other (Comment)  (further medical w/u for presyncope/syncope  )   Equipment Recommended Glenda [place]; Wheelchair   Additional Comments SHANNAN, decreased caregiver support; continued episodes of syncope          Anai Guillermo, PT, DPT

## 2018-11-15 NOTE — PLAN OF CARE
Problem: Potential for Falls  Goal: Patient will remain free of falls  INTERVENTIONS:  - Assess patient frequently for physical needs  -  Identify cognitive and physical deficits and behaviors that affect risk of falls  -  [place] fall precautions as indicated by assessment   - Educate patient/family on patient safety including physical limitations  - Instruct patient to call for assistance with activity based on assessment  - Modify environment to reduce risk of injury  - Consider OT/PT consult to assist with strengthening/mobility   Outcome: Progressing      Problem: Prexisting or High Potential for Compromised Skin Integrity  Goal: Skin integrity is maintained or improved  INTERVENTIONS:  - Identify patients at risk for skin breakdown  - Assess and monitor skin integrity  - Assess and monitor nutrition and hydration status  - Monitor labs (i e  albumin)  - Assess for incontinence   - Turn and reposition patient  - Assist with mobility/ambulation  - Relieve pressure over bony prominences  - Avoid friction and shearing  - Provide appropriate hygiene as needed including keeping skin clean and dry  - Evaluate need for skin moisturizer/barrier cream  - Collaborate with interdisciplinary team (i e  Nutrition, Rehabilitation, etc )   - Patient/family teaching   Outcome: Progressing      Problem: Nutrition/Hydration-ADULT  Goal: Nutrient/Hydration intake appropriate for improving, restoring or maintaining nutritional needs  Monitor and assess patient's nutrition/hydration status for malnutrition (ex- brittle hair, bruises, dry skin, pale skin and conjunctiva, muscle wasting, smooth red tongue, and disorientation)  Collaborate with interdisciplinary team and initiate plan and interventions as ordered  Monitor patient's weight and dietary intake as ordered or per policy  Utilize nutrition screening tool and intervene per policy   Determine patient's food preferences and provide high-protein, high-caloric foods as appropriate       INTERVENTIONS:  - Monitor oral intake, urinary output, labs, and treatment plans  - Assess nutrition and hydration status and recommend course of action  - Evaluate amount of meals eaten  - Assist patient with eating if necessary   - Allow adequate time for meals  - Recommend/ encourage appropriate diets, oral nutritional supplements, and vitamin/mineral supplements  - Order, calculate, and assess calorie counts as needed  - Recommend, monitor, and adjust tube feedings and TPN/PPN based on assessed needs  - Assess need for intravenous fluids  - Provide specific nutrition/hydration education as appropriate  - Include patient/family/caregiver in decisions related to nutrition   Outcome: Progressing      Problem: PAIN - ADULT  Goal: Verbalizes/displays adequate comfort level or baseline comfort level  Interventions:  - Encourage patient to monitor pain and request assistance  - Assess pain using appropriate pain scale  - Administer analgesics based on type and severity of pain and evaluate response  - Implement non-pharmacological measures as appropriate and evaluate response  - Consider cultural and social influences on pain and pain management  - Notify physician/advanced practitioner if interventions unsuccessful or patient reports new pain   Outcome: Not Progressing      Problem: SAFETY ADULT  Goal: Maintain or return to baseline ADL function  INTERVENTIONS:  -  Assess patient's ability to carry out ADLs; assess patient's baseline for ADL function and identify physical deficits which impact ability to perform ADLs (bathing, care of mouth/teeth, toileting, grooming, dressing, etc )  - Assess/evaluate cause of self-care deficits   - Assess range of motion  - Assess patient's mobility; develop plan if impaired  - Assess patient's need for assistive devices and provide as appropriate  - Encourage maximum independence but intervene and supervise when necessary  ¯ Involve family in performance of ADLs  ¯ Assess for home care needs following discharge   ¯ Request OT consult to assist with ADL evaluation and planning for discharge  ¯ Provide patient education as appropriate   Outcome: Not Progressing    Goal: Maintain or return mobility status to optimal level  INTERVENTIONS:  - Assess patient's baseline mobility status (ambulation, transfers, stairs, etc )    - Identify cognitive and physical deficits and behaviors that affect mobility  - Identify mobility aids required to assist with transfers and/or ambulation (gait belt, sit-to-stand, lift, walker, cane, etc )  - [place] fall precautions as indicated by assessment  - Record patient progress and toleration of activity level on Mobility SBAR; progress patient to next Phase/Stage  - Instruct patient to call for assistance with activity based on assessment  - Request Rehabilitation consult to assist with strengthening/weightbearing, etc    Outcome: Progressing      Problem: DISCHARGE PLANNING  Goal: Discharge to home or other facility with appropriate resources  INTERVENTIONS:  - Identify barriers to discharge w/patient and caregiver  - Arrange for needed discharge resources and transportation as appropriate  - Identify discharge learning needs (meds, wound care, etc )  - Arrange for interpretive services to assist at discharge as needed  - Refer to Case Management Department for coordinating discharge planning if the patient needs post-hospital services based on physician/advanced practitioner order or complex needs related to functional status, cognitive ability, or social support system   Outcome: Progressing      Problem: DISCHARGE PLANNING - CARE MANAGEMENT  Goal: Discharge to post-acute care or home with appropriate resources  INTERVENTIONS:  - Conduct assessment to determine patient/family and health care team treatment goals, and need for post-acute services based on payer coverage, community resources, and patient preferences, and barriers to discharge  - Address psychosocial, clinical, and financial barriers to discharge as identified in assessment in conjunction with the patient/family and health care team  - Arrange appropriate level of post-acute services according to patients   needs and preference and payer coverage in collaboration with the physician and health care team  - Communicate with and update the patient/family, physician, and health care team regarding progress on the discharge plan  - Arrange appropriate transportation to post-acute venues   Outcome: Progressing

## 2018-11-15 NOTE — SOCIAL WORK
CM called [facility] and obtained transport from [facility] to [facility] via BLS  CM request 1pm transport time and Danica Muniz will make contact regarding who will transport  CM will follow through discharge

## 2018-11-15 NOTE — ASSESSMENT & PLAN NOTE
Patient again had another episode of syncope today while working with PT  Apparently felt dizzy and passed out for few seconds  Rapid response was called, patient was assessed and blood pressure noted to be 145/80  Heart rate was 72  Patient complained of back pain    EKG obtained showed normal sinus rhythm     IV normal saline bolus 500 cc x1, then 75 cc/hour  Tele monitoring for 24 hours  Pain control  Updated geneva Craven

## 2018-11-15 NOTE — SOCIAL WORK
Tentative d c later today pending BM  Attending and nursing following  Cm sent message to [facility] and will continue to follow patient

## 2018-11-15 NOTE — PLAN OF CARE
Problem: PHYSICAL THERAPY ADULT  Goal: Performs mobility at highest level of function for planned discharge setting  See evaluation for individualized goals  Treatment/Interventions: Functional transfer training, LE strengthening/ROM, Elevations, Therapeutic exercise, Endurance training, Patient/family training, Equipment eval/education, Bed mobility, Gait training, Spoke to nursing  Equipment Recommended: Rayna Deras, Wheelchair       See flowsheet documentation for full assessment, interventions and recommendations  Outcome: Not Progressing  Prognosis: Fair  Problem List: Decreased strength, Decreased range of motion, Decreased endurance, Impaired balance, Decreased mobility, Decreased coordination, Pain (gait deviations)  Assessment: Pt requires more physical A for mobility including transfers and gait  Pt did not amb as far this session, and continued to c/o lightheadedness which did resolve after first and second stand trials  Rapid response called during PT session after decreased responsiveness and unresponsiveness  Recommend continued trials as medical status allows for progression of mobility, including trials with rolling walker and TLSO with caution for lightheadedness  Also recommend further w/u from medical standpoint based on pt's s/s  Barriers to Discharge: Decreased caregiver support, Inaccessible home environment  Barriers to Discharge Comments: SHANNAN, home alone  Recommendation: OT consult, Rehab consult, Post acute IP rehab, Other (Comment) (further medical w/u for presyncope/syncope  )          See flowsheet documentation for full assessment

## 2018-11-16 VITALS
SYSTOLIC BLOOD PRESSURE: 180 MMHG | HEART RATE: 73 BPM | TEMPERATURE: 97.7 F | RESPIRATION RATE: 18 BRPM | DIASTOLIC BLOOD PRESSURE: 66 MMHG | OXYGEN SATURATION: 95 % | BODY MASS INDEX: 21.92 KG/M2 | WEIGHT: 116 LBS

## 2018-11-16 PROCEDURE — 99239 HOSP IP/OBS DSCHRG MGMT >30: CPT | Performed by: INTERNAL MEDICINE

## 2018-11-16 RX ORDER — OXYCODONE HYDROCHLORIDE 5 MG/1
5 TABLET ORAL EVERY 4 HOURS PRN
Qty: 30 TABLET | Refills: 0 | Status: SHIPPED | OUTPATIENT
Start: 2018-11-16 | End: 2018-11-26

## 2018-11-16 RX ORDER — LIDOCAINE 50 MG/G
1 PATCH TOPICAL DAILY
Qty: 30 PATCH | Refills: 0 | Status: SHIPPED | OUTPATIENT
Start: 2018-11-17

## 2018-11-16 RX ORDER — BISACODYL 10 MG
10 SUPPOSITORY, RECTAL RECTAL DAILY
Qty: 12 SUPPOSITORY | Refills: 0 | Status: SHIPPED | OUTPATIENT
Start: 2018-11-17

## 2018-11-16 RX ORDER — METHOCARBAMOL 500 MG/1
500 TABLET, FILM COATED ORAL EVERY 6 HOURS PRN
Qty: 30 TABLET | Refills: 0 | Status: SHIPPED | OUTPATIENT
Start: 2018-11-16

## 2018-11-16 RX ORDER — SENNOSIDES 8.6 MG
1 TABLET ORAL
Qty: 120 EACH | Refills: 0 | Status: SHIPPED | OUTPATIENT
Start: 2018-11-16

## 2018-11-16 RX ORDER — POLYETHYLENE GLYCOL 3350 17 G/17G
17 POWDER, FOR SOLUTION ORAL DAILY
Qty: 14 EACH | Refills: 0 | Status: SHIPPED | OUTPATIENT
Start: 2018-11-17

## 2018-11-16 RX ADMIN — OXYCODONE HYDROCHLORIDE 2.5 MG: 5 TABLET ORAL at 13:35

## 2018-11-16 RX ADMIN — ACETAMINOPHEN 650 MG: 325 TABLET ORAL at 13:00

## 2018-11-16 RX ADMIN — POLYETHYLENE GLYCOL 3350 17 G: 17 POWDER, FOR SOLUTION ORAL at 08:02

## 2018-11-16 RX ADMIN — METOPROLOL SUCCINATE 25 MG: 25 TABLET, EXTENDED RELEASE ORAL at 08:02

## 2018-11-16 RX ADMIN — VITAMIN D, TAB 1000IU (100/BT) 2000 UNITS: 25 TAB at 08:02

## 2018-11-16 RX ADMIN — ACETAMINOPHEN 650 MG: 325 TABLET ORAL at 05:37

## 2018-11-16 RX ADMIN — HEPARIN SODIUM 5000 UNITS: 5000 INJECTION, SOLUTION INTRAVENOUS; SUBCUTANEOUS at 05:39

## 2018-11-16 RX ADMIN — AMLODIPINE BESYLATE 2.5 MG: 2.5 TABLET ORAL at 08:02

## 2018-11-16 NOTE — SOCIAL WORK
Per RADHA Mckeon pt's transport was bumped back to 1400 with [facility]  Nurse notified

## 2018-11-16 NOTE — PLAN OF CARE
DISCHARGE PLANNING     Discharge to home or other facility with appropriate resources Progressing        DISCHARGE PLANNING - CARE MANAGEMENT     Discharge to post-acute care or home with appropriate resources Progressing        Nutrition/Hydration-ADULT     Nutrient/Hydration intake appropriate for improving, restoring or maintaining nutritional needs Progressing        PAIN - ADULT     Verbalizes/displays adequate comfort level or baseline comfort level Progressing        Potential for Falls     Patient will remain free of falls Progressing        Prexisting or High Potential for Compromised Skin Integrity     Skin integrity is maintained or improved Progressing        SAFETY ADULT     Maintain or return to baseline ADL function Progressing     Maintain or return mobility status to optimal level Progressing

## 2018-11-16 NOTE — DISCHARGE SUMMARY
Discharge Summary - [address] [facility]    Patient Information: Elisa Daniel 80 y o  female MRN: [id number]  Unit/Bed#: -01 Encounter: [id number]    Discharging Physician / Practitioner: Kingsley Ruiz MD  PCP: Isaac Sanders MD  Admission Date: 11/12/2018  Discharge Date: 11/16/18    Disposition:     Home    Reason for Admission:  Back pain    Discharge Diagnoses:     Principal Problem:    Acute thoracic back pain  Active Problems:    Syncope    Essential hypertension    Chronic kidney disease, stage 3 (HCC)    Mild protein-calorie malnutrition (HCC)    Abnormal chest CT  Resolved Problems:    * No resolved hospital problems  *      Consultations During Hospital Stay:  · Orthopedics  · Neurosurgery    Procedures Performed:     · CT thoracic spine  1  Chronic T12 compression fracture with significant interval loss of height and fracture fragment retropulsion resulting in 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  2   Mild, chronic T8 and T10 compression fractures  3   Airspace and endobronchial opacities throughout both lungs, most prominent at the lung bases suggesting acute and chronic aspiration pneumonia  4   Nonobstructing 3 mm left renal calculus  Significant Findings / Test Results:     · As above    Incidental Findings:   As above  Test Results Pending at Discharge (will require follow up): · None     Outpatient Tests Requested:  · None    Complications:  None    Hospital Course:     Elisa Daniel is a 80 y o  female patient who originally presented to the hospital on 11/12/2018 due to thoracic back pain  Patient and family reported several falls at home  She developed back pain in the thoracic region hence came to the emergency department for evaluation  She had a CT of the thoracic spine in the emergency department which noted to have T8, 10 &12 compression fractures, in addition possible pneumonia also noted   However patient did not exhibit any signs of infection or respiratory symptoms  Her procalcitonin levels were normal   For compression fractures patient was seen by Orthopedics and Neurosurgery, recommended no surgical intervention  She was prescribed spinal brace  She was seen by Physical therapy and Occupational therapy recommended short-term rehabilitation  While working with PT she had episode of syncope due to severe back pain, which was felt to be likely vasovagal event  No events noted on telemetry  She did have constipation which resolved with laxatives  She is being discharged to skilled nursing facility in stable condition  Condition at Discharge: good     Discharge Day Visit / Exam:     Subjective:  Feeling better today  Mild low back pain  Had a bowel movement  Vitals: Blood Pressure: (!) 180/66 (rn notified) (11/16/18 2622)  Pulse: 73 (11/16/18 0722)  Temperature: 97 7 °F (36 5 °C) (11/16/18 0719)  Temp Source: Oral (11/16/18 0719)  Respirations: 18 (11/16/18 0719)  Weight - Scale: 52 6 kg (116 lb) (11/12/18 1810)  SpO2: 95 % (11/16/18 0722)  Exam:   Physical Exam     Gen -Patient comfortable at rest  Neck- Supple  No thyromegaly or lymphadenopathy  Lungs-Clear bilaterally without any wheeze or rales   Heart S1-S2, regular rate and rhythm, no murmurs  Abdomen-soft nontender, no organomegaly  Bowel sounds present  Extremities-no cyanosi,  clubbing or edema  Skin- no rash  Neuro-awake alert and oriented     Discussion with Family:  Updated Colleen     Discharge instructions/Information to patient and family:   See after visit summary for information provided to patient and family  Provisions for Follow-Up Care:  See after visit summary for information related to follow-up care and any pertinent home health orders  Planned Readmission: no     Discharge Statement:  I spent 35 minutes discharging the patient  This time was spent on the day of discharge  I had direct contact with the patient on the day of discharge   Greater than 50% of the total time was spent examining patient, answering all patient questions, arranging and discussing plan of care with patient as well as directly providing post-discharge instructions  Additional time then spent on discharge activities  Discharge Medications:  See after visit summary for reconciled discharge medications provided to patient and family        ** Please Note: This note has been constructed using a voice recognition system **

## 2018-11-17 LAB
ATRIAL RATE: 73 BPM
P AXIS: 100 DEGREES
PR INTERVAL: 164 MS
QRS AXIS: 58 DEGREES
QRSD INTERVAL: 76 MS
QT INTERVAL: 400 MS
QTC INTERVAL: 440 MS
T WAVE AXIS: 44 DEGREES
VENTRICULAR RATE: 73 BPM

## 2018-11-17 PROCEDURE — 93010 ELECTROCARDIOGRAM REPORT: CPT | Performed by: INTERNAL MEDICINE

## 2018-11-19 ENCOUNTER — TRANSITIONAL CARE MANAGEMENT (OUTPATIENT)
Dept: FAMILY MEDICINE CLINIC | Facility: CLINIC | Age: 83
End: 2018-11-19

## 2018-12-24 ENCOUNTER — TRANSITIONAL CARE MANAGEMENT (OUTPATIENT)
Dept: FAMILY MEDICINE CLINIC | Facility: CLINIC | Age: 83
End: 2018-12-24

## 2021-03-23 NOTE — PLAN OF CARE
DISCHARGE PLANNING     Discharge to home or other facility with appropriate resources Progressing        DISCHARGE PLANNING - CARE MANAGEMENT     Discharge to post-acute care or home with appropriate resources Progressing        Nutrition/Hydration-ADULT     Nutrient/Hydration intake appropriate for improving, restoring or maintaining nutritional needs Progressing        PAIN - ADULT     Verbalizes/displays adequate comfort level or baseline comfort level Progressing        Potential for Falls     Patient will remain free of falls Progressing        Prexisting or High Potential for Compromised Skin Integrity     Skin integrity is maintained or improved Progressing        SAFETY ADULT     Maintain or return to baseline ADL function Progressing     Maintain or return mobility status to optimal level Progressing today

## 2023-12-19 NOTE — PHYSICIAN ADVISOR
Current patient class: Inpatient  The patient is currently on Hospital Day: 3 at [facility]      The patient was admitted to the hospital at 2059 on 11/12/18 for the following diagnosis:  Lower back pain [M54 5]  Gait instability [R26 81]  Compression fx, thoracic spine ([address] ) [S22 000A]  T12 compression fracture ([address] ) [S22 080A]  Intractable pain [R52]  Central stenosis of spinal canal [M48 00]       There is documentation in the medical record of an expected length of stay of at least 2 midnights  The patient is therefore expected to satisfy the 2 midnight benchmark and given the 2 midnight presumption is appropriate for INPATIENT ADMISSION  Given this expectation of a satisfying stay, CMS instructs us that the patient is most often appropriate for inpatient admission under part A provided medical necessity is documented in the chart  After review of the relevant documentation, labs, vital signs and test results, the patient is appropriate for INPATIENT ADMISSION  Admission to the hospital as an inpatient is a complex decision making process which requires the practitioner to consider the patients presenting complaint, history and physical examination and all relevant testing  With this in mind, in this case, the patient was deemed appropriate for INPATIENT ADMISSION  After review of the documentation and testing available at the time of the admission I concur with this clinical determination of medical necessity  Rationale is as follows: The patient is a 80 yrs old Female who presented to the ED at 11/12/2018  6:08 PM with a chief complaint of Back Pain (Pt was just walking from her porch to her living room and had sudden lower back pain  Now severe pain with movement since   No trauma or injury   )     Given the need for further hospitalization, and along with the documentation of medical necessity present in the chart, the patient is appropriate for inpatient admission  The patient is expected to satisfy the 2 midnight benchmark, and will require further acute medical care  The patient does have comorbid conditions which increases the risk for significant adverse outcome  Given this the patient is appropriate for inpatient admission        The patients vitals on arrival were ED Triage Vitals   Temperature Pulse Respirations Blood Pressure SpO2   11/12/18 1810 11/12/18 1810 11/12/18 1810 11/12/18 1817 11/12/18 1810   (!) 97 4 °F (36 3 °C) 73 18 160/80 97 %      Temp Source Heart Rate Source Patient Position - Orthostatic VS BP Location FiO2 (%)   11/12/18 1810 11/12/18 1810 11/12/18 1810 11/12/18 1810 --   Oral Monitor Sitting Right arm       Pain Score       11/12/18 1810       Worst Possible Pain           Past Medical History:   Diagnosis Date    Compression fracture     Aug 2015    COPD (chronic obstructive pulmonary disease) (HCC)     Hematuria     Hypertension     Osteopenia     Renal disorder      Past Surgical History:   Procedure Laterality Date    HIP SURGERY Left     fx           Consults have been placed to:   IP CONSULT TO ORTHOPEDIC SURGERY  IP CONSULT TO NEUROSURGERY    Vitals:    11/12/18 2300 11/13/18 0750 11/13/18 1505 11/13/18 2233   BP: 148/80 167/78 166/72 160/76   BP Location: Left arm   Left arm   Pulse: 88 64 79 80   Resp: 18 20 18 18   Temp: 98 °F (36 7 °C) 98 4 °F (36 9 °C) 98 2 °F (36 8 °C) 98 °F (36 7 °C)   TempSrc: Oral   Axillary   SpO2: 96% 93% 94% 94%   Weight:           Most recent labs:    Recent Labs      11/12/18   1909  11/13/18   0542   WBC  9 16  6 23   HGB  13 0  11 9   HCT  40 5  36 9   PLT  188  167   K  4 1  3 5   CALCIUM  9 3  8 8   BUN  23  17   CREATININE  1 13  1 03   LIPASE  132   --    AST  24   --    ALT  26   --    ALKPHOS  66   --        Scheduled Meds:  Current Facility-Administered Medications:  acetaminophen 650 mg Oral Q6H [address] Tawana Devlin PA-C   amLODIPine 2 5 mg Oral Daily Lseter Torres PA-C cholecalciferol 2,000 Units Oral Daily Andre Hitchcock PA-C   heparin (porcine) 5,000 Units Subcutaneous [facility] Tawana Epstein PA-C   lidocaine 1 patch Topical Daily Andre Hitchcock PA-C   methocarbamol 500 mg Oral Q6H PRN Andre Hitchcock PA-C   metoprolol succinate 25 mg Oral Daily Andre Hitchcock PA-C   ondansetron 4 mg Intravenous Q6H PRN Andre Hitchcock PA-C     Continuous Infusions:   PRN Meds: methocarbamol    ondansetron    Surgical procedures (if appropriate): normal...